# Patient Record
Sex: MALE | Race: WHITE | NOT HISPANIC OR LATINO | Employment: OTHER | URBAN - METROPOLITAN AREA
[De-identification: names, ages, dates, MRNs, and addresses within clinical notes are randomized per-mention and may not be internally consistent; named-entity substitution may affect disease eponyms.]

---

## 2024-03-14 PROBLEM — M48.062 NEUROGENIC CLAUDICATION DUE TO LUMBAR SPINAL STENOSIS: Status: ACTIVE | Noted: 2024-03-14

## 2024-05-06 ENCOUNTER — HOSPITAL ENCOUNTER (OUTPATIENT)
Dept: RADIOLOGY | Facility: MEDICAL CENTER | Age: 79
End: 2024-05-06
Payer: COMMERCIAL

## 2024-05-07 ENCOUNTER — APPOINTMENT (OUTPATIENT)
Dept: RADIOLOGY | Facility: MEDICAL CENTER | Age: 79
DRG: 029 | End: 2024-05-07
Attending: STUDENT IN AN ORGANIZED HEALTH CARE EDUCATION/TRAINING PROGRAM
Payer: COMMERCIAL

## 2024-05-07 ENCOUNTER — HOSPITAL ENCOUNTER (INPATIENT)
Facility: MEDICAL CENTER | Age: 79
LOS: 2 days | DRG: 029 | End: 2024-05-09
Attending: STUDENT IN AN ORGANIZED HEALTH CARE EDUCATION/TRAINING PROGRAM | Admitting: STUDENT IN AN ORGANIZED HEALTH CARE EDUCATION/TRAINING PROGRAM
Payer: COMMERCIAL

## 2024-05-07 ENCOUNTER — APPOINTMENT (OUTPATIENT)
Dept: RADIOLOGY | Facility: MEDICAL CENTER | Age: 79
DRG: 029 | End: 2024-05-07
Attending: INTERNAL MEDICINE
Payer: COMMERCIAL

## 2024-05-07 DIAGNOSIS — M48.062 NEUROGENIC CLAUDICATION DUE TO LUMBAR SPINAL STENOSIS: ICD-10-CM

## 2024-05-07 DIAGNOSIS — G96.00 POSTOPERATIVE CSF LEAK: ICD-10-CM

## 2024-05-07 DIAGNOSIS — G97.82 POSTOPERATIVE CSF LEAK: ICD-10-CM

## 2024-05-07 DIAGNOSIS — R51.9 NONINTRACTABLE HEADACHE, UNSPECIFIED CHRONICITY PATTERN, UNSPECIFIED HEADACHE TYPE: ICD-10-CM

## 2024-05-07 PROBLEM — T88.8XXA FLUID COLLECTION AT SURGICAL SITE: Status: ACTIVE | Noted: 2024-05-07

## 2024-05-07 PROBLEM — Z71.89 ACP (ADVANCE CARE PLANNING): Status: ACTIVE | Noted: 2024-05-07

## 2024-05-07 LAB
ALBUMIN SERPL BCP-MCNC: 4.2 G/DL (ref 3.2–4.9)
ALBUMIN/GLOB SERPL: 1.8 G/DL
ALP SERPL-CCNC: 74 U/L (ref 30–99)
ALT SERPL-CCNC: 24 U/L (ref 2–50)
ANION GAP SERPL CALC-SCNC: 11 MMOL/L (ref 7–16)
APPEARANCE UR: CLEAR
AST SERPL-CCNC: 12 U/L (ref 12–45)
BASOPHILS # BLD AUTO: 0.3 % (ref 0–1.8)
BASOPHILS # BLD: 0.03 K/UL (ref 0–0.12)
BILIRUB SERPL-MCNC: 0.3 MG/DL (ref 0.1–1.5)
BILIRUB UR QL STRIP.AUTO: NEGATIVE
BUN SERPL-MCNC: 20 MG/DL (ref 8–22)
CALCIUM ALBUM COR SERPL-MCNC: 8.7 MG/DL (ref 8.5–10.5)
CALCIUM SERPL-MCNC: 8.9 MG/DL (ref 8.5–10.5)
CHLORIDE SERPL-SCNC: 105 MMOL/L (ref 96–112)
CO2 SERPL-SCNC: 23 MMOL/L (ref 20–33)
COLOR UR: YELLOW
CORTIS SERPL-MCNC: 12.7 UG/DL (ref 0–23)
CREAT SERPL-MCNC: 0.82 MG/DL (ref 0.5–1.4)
CRP SERPL HS-MCNC: <0.3 MG/DL (ref 0–0.75)
EKG IMPRESSION: NORMAL
EOSINOPHIL # BLD AUTO: 0.56 K/UL (ref 0–0.51)
EOSINOPHIL NFR BLD: 6.3 % (ref 0–6.9)
ERYTHROCYTE [DISTWIDTH] IN BLOOD BY AUTOMATED COUNT: 42.2 FL (ref 35.9–50)
ERYTHROCYTE [SEDIMENTATION RATE] IN BLOOD BY WESTERGREN METHOD: 4 MM/HOUR (ref 0–20)
GFR SERPLBLD CREATININE-BSD FMLA CKD-EPI: 90 ML/MIN/1.73 M 2
GLOBULIN SER CALC-MCNC: 2.4 G/DL (ref 1.9–3.5)
GLUCOSE SERPL-MCNC: 102 MG/DL (ref 65–99)
GLUCOSE UR STRIP.AUTO-MCNC: NEGATIVE MG/DL
HCT VFR BLD AUTO: 41.3 % (ref 42–52)
HGB BLD-MCNC: 14.3 G/DL (ref 14–18)
IMM GRANULOCYTES # BLD AUTO: 0.03 K/UL (ref 0–0.11)
IMM GRANULOCYTES NFR BLD AUTO: 0.3 % (ref 0–0.9)
INR PPP: 1.07 (ref 0.87–1.13)
KETONES UR STRIP.AUTO-MCNC: NEGATIVE MG/DL
LACTATE SERPL-SCNC: 1 MMOL/L (ref 0.5–2)
LDH SERPL L TO P-CCNC: 162 U/L (ref 107–266)
LEUKOCYTE ESTERASE UR QL STRIP.AUTO: NEGATIVE
LYMPHOCYTES # BLD AUTO: 1.86 K/UL (ref 1–4.8)
LYMPHOCYTES NFR BLD: 21.1 % (ref 22–41)
MCH RBC QN AUTO: 30.6 PG (ref 27–33)
MCHC RBC AUTO-ENTMCNC: 34.6 G/DL (ref 32.3–36.5)
MCV RBC AUTO: 88.2 FL (ref 81.4–97.8)
MICRO URNS: ABNORMAL
MONOCYTES # BLD AUTO: 0.6 K/UL (ref 0–0.85)
MONOCYTES NFR BLD AUTO: 6.8 % (ref 0–13.4)
NEUTROPHILS # BLD AUTO: 5.75 K/UL (ref 1.82–7.42)
NEUTROPHILS NFR BLD: 65.2 % (ref 44–72)
NITRITE UR QL STRIP.AUTO: NEGATIVE
NRBC # BLD AUTO: 0 K/UL
NRBC BLD-RTO: 0 /100 WBC (ref 0–0.2)
PH UR STRIP.AUTO: 5.5 [PH] (ref 5–8)
PLATELET # BLD AUTO: 246 K/UL (ref 164–446)
PMV BLD AUTO: 9.5 FL (ref 9–12.9)
POTASSIUM SERPL-SCNC: 3.9 MMOL/L (ref 3.6–5.5)
PROCALCITONIN SERPL-MCNC: <0.05 NG/ML
PROT SERPL-MCNC: 6.6 G/DL (ref 6–8.2)
PROT UR QL STRIP: NEGATIVE MG/DL
PROTHROMBIN TIME: 14.1 SEC (ref 12–14.6)
RBC # BLD AUTO: 4.68 M/UL (ref 4.7–6.1)
RBC UR QL AUTO: NEGATIVE
SODIUM SERPL-SCNC: 139 MMOL/L (ref 135–145)
SP GR UR STRIP.AUTO: >=1.045
UROBILINOGEN UR STRIP.AUTO-MCNC: 0.2 MG/DL
WBC # BLD AUTO: 8.8 K/UL (ref 4.8–10.8)

## 2024-05-07 PROCEDURE — 99223 1ST HOSP IP/OBS HIGH 75: CPT | Mod: 25,AI | Performed by: STUDENT IN AN ORGANIZED HEALTH CARE EDUCATION/TRAINING PROGRAM

## 2024-05-07 PROCEDURE — 99497 ADVNCD CARE PLAN 30 MIN: CPT | Performed by: STUDENT IN AN ORGANIZED HEALTH CARE EDUCATION/TRAINING PROGRAM

## 2024-05-07 PROCEDURE — 93010 ELECTROCARDIOGRAM REPORT: CPT | Performed by: INTERNAL MEDICINE

## 2024-05-07 RX ORDER — ACETAMINOPHEN 325 MG/1
650 TABLET ORAL EVERY 6 HOURS
Status: DISCONTINUED | OUTPATIENT
Start: 2024-05-07 | End: 2024-05-09 | Stop reason: HOSPADM

## 2024-05-07 RX ORDER — AMOXICILLIN 250 MG
2 CAPSULE ORAL EVERY EVENING
Status: DISCONTINUED | OUTPATIENT
Start: 2024-05-07 | End: 2024-05-09 | Stop reason: HOSPADM

## 2024-05-07 RX ORDER — ONDANSETRON 2 MG/ML
4 INJECTION INTRAMUSCULAR; INTRAVENOUS EVERY 4 HOURS PRN
Status: DISCONTINUED | OUTPATIENT
Start: 2024-05-07 | End: 2024-05-09 | Stop reason: HOSPADM

## 2024-05-07 RX ORDER — OXYCODONE HYDROCHLORIDE 5 MG/1
2.5 TABLET ORAL
Status: DISCONTINUED | OUTPATIENT
Start: 2024-05-07 | End: 2024-05-09 | Stop reason: HOSPADM

## 2024-05-07 RX ORDER — SCOLOPAMINE TRANSDERMAL SYSTEM 1 MG/1
1 PATCH, EXTENDED RELEASE TRANSDERMAL
Status: DISCONTINUED | OUTPATIENT
Start: 2024-05-07 | End: 2024-05-07

## 2024-05-07 RX ORDER — FINASTERIDE 5 MG/1
5 TABLET, FILM COATED ORAL DAILY
Status: DISCONTINUED | OUTPATIENT
Start: 2024-05-07 | End: 2024-05-07

## 2024-05-07 RX ORDER — BUTALBITAL, ACETAMINOPHEN AND CAFFEINE 50; 325; 40 MG/1; MG/1; MG/1
1 TABLET ORAL EVERY 6 HOURS PRN
Status: DISCONTINUED | OUTPATIENT
Start: 2024-05-07 | End: 2024-05-09 | Stop reason: HOSPADM

## 2024-05-07 RX ORDER — TAMSULOSIN HYDROCHLORIDE 0.4 MG/1
0.4 CAPSULE ORAL
Status: DISCONTINUED | OUTPATIENT
Start: 2024-05-07 | End: 2024-05-07

## 2024-05-07 RX ORDER — ACETAMINOPHEN 325 MG/1
650 TABLET ORAL EVERY 6 HOURS PRN
Status: DISCONTINUED | OUTPATIENT
Start: 2024-05-12 | End: 2024-05-09 | Stop reason: HOSPADM

## 2024-05-07 RX ORDER — OXYCODONE HYDROCHLORIDE 5 MG/1
5 TABLET ORAL
Status: DISCONTINUED | OUTPATIENT
Start: 2024-05-07 | End: 2024-05-09 | Stop reason: HOSPADM

## 2024-05-07 RX ORDER — IBUPROFEN 800 MG/1
800 TABLET ORAL 3 TIMES DAILY PRN
Status: DISCONTINUED | OUTPATIENT
Start: 2024-05-10 | End: 2024-05-07

## 2024-05-07 RX ORDER — ACETAMINOPHEN 325 MG/1
650 TABLET ORAL EVERY 6 HOURS PRN
Status: DISCONTINUED | OUTPATIENT
Start: 2024-05-07 | End: 2024-05-07

## 2024-05-07 RX ORDER — IPRATROPIUM BROMIDE AND ALBUTEROL SULFATE 2.5; .5 MG/3ML; MG/3ML
3 SOLUTION RESPIRATORY (INHALATION)
Status: DISCONTINUED | OUTPATIENT
Start: 2024-05-07 | End: 2024-05-09 | Stop reason: HOSPADM

## 2024-05-07 RX ORDER — HYDROMORPHONE HYDROCHLORIDE 1 MG/ML
0.25 INJECTION, SOLUTION INTRAMUSCULAR; INTRAVENOUS; SUBCUTANEOUS
Status: DISCONTINUED | OUTPATIENT
Start: 2024-05-07 | End: 2024-05-09 | Stop reason: HOSPADM

## 2024-05-07 RX ORDER — METHOCARBAMOL 750 MG/1
750 TABLET, FILM COATED ORAL 4 TIMES DAILY PRN
Status: DISCONTINUED | OUTPATIENT
Start: 2024-05-07 | End: 2024-05-09 | Stop reason: HOSPADM

## 2024-05-07 RX ORDER — ONDANSETRON 4 MG/1
4 TABLET, ORALLY DISINTEGRATING ORAL EVERY 4 HOURS PRN
Status: DISCONTINUED | OUTPATIENT
Start: 2024-05-07 | End: 2024-05-09 | Stop reason: HOSPADM

## 2024-05-07 RX ORDER — LABETALOL HYDROCHLORIDE 5 MG/ML
10 INJECTION, SOLUTION INTRAVENOUS EVERY 4 HOURS PRN
Status: DISCONTINUED | OUTPATIENT
Start: 2024-05-07 | End: 2024-05-09 | Stop reason: HOSPADM

## 2024-05-07 RX ORDER — SODIUM CHLORIDE, SODIUM LACTATE, POTASSIUM CHLORIDE, AND CALCIUM CHLORIDE .6; .31; .03; .02 G/100ML; G/100ML; G/100ML; G/100ML
500 INJECTION, SOLUTION INTRAVENOUS
Status: DISCONTINUED | OUTPATIENT
Start: 2024-05-07 | End: 2024-05-09 | Stop reason: HOSPADM

## 2024-05-07 RX ORDER — LISINOPRIL 2.5 MG/1
5 TABLET ORAL DAILY
Status: DISCONTINUED | OUTPATIENT
Start: 2024-05-07 | End: 2024-05-09 | Stop reason: HOSPADM

## 2024-05-07 RX ORDER — POLYETHYLENE GLYCOL 3350 17 G/17G
1 POWDER, FOR SOLUTION ORAL
Status: DISCONTINUED | OUTPATIENT
Start: 2024-05-07 | End: 2024-05-09 | Stop reason: HOSPADM

## 2024-05-07 RX ORDER — KETOROLAC TROMETHAMINE 15 MG/ML
15 INJECTION, SOLUTION INTRAMUSCULAR; INTRAVENOUS EVERY 6 HOURS
Status: DISCONTINUED | OUTPATIENT
Start: 2024-05-07 | End: 2024-05-07

## 2024-05-07 RX ORDER — LORAZEPAM 0.5 MG/1
0.5 TABLET ORAL
Status: DISCONTINUED | OUTPATIENT
Start: 2024-05-07 | End: 2024-05-09 | Stop reason: HOSPADM

## 2024-05-07 RX ADMIN — LISINOPRIL 5 MG: 2.5 TABLET ORAL at 06:08

## 2024-05-07 RX ADMIN — BUTALBITAL, ACETAMINOPHEN AND CAFFEINE 1 TABLET: 325; 50; 40 TABLET ORAL at 17:06

## 2024-05-07 RX ADMIN — BUTALBITAL, ACETAMINOPHEN AND CAFFEINE 1 TABLET: 325; 50; 40 TABLET ORAL at 10:17

## 2024-05-07 RX ADMIN — GADOTERIDOL 15 ML: 279.3 INJECTION, SOLUTION INTRAVENOUS at 05:30

## 2024-05-07 RX ADMIN — ACETAMINOPHEN 650 MG: 325 TABLET, FILM COATED ORAL at 23:07

## 2024-05-07 RX ADMIN — ACETAMINOPHEN 650 MG: 325 TABLET, FILM COATED ORAL at 17:07

## 2024-05-07 RX ADMIN — SENNOSIDES AND DOCUSATE SODIUM 2 TABLET: 50; 8.6 TABLET ORAL at 17:06

## 2024-05-07 ASSESSMENT — ENCOUNTER SYMPTOMS
VOMITING: 0
SHORTNESS OF BREATH: 0
ABDOMINAL PAIN: 0
CHILLS: 0
SPEECH CHANGE: 0
BLURRED VISION: 0
HEADACHES: 1
DOUBLE VISION: 0
DIZZINESS: 0
PALPITATIONS: 0
FOCAL WEAKNESS: 0
COUGH: 0
MYALGIAS: 0
SEIZURES: 0
WEAKNESS: 0
BRUISES/BLEEDS EASILY: 0
HEARTBURN: 0
NAUSEA: 0
DEPRESSION: 0
FEVER: 0

## 2024-05-07 ASSESSMENT — PAIN DESCRIPTION - PAIN TYPE
TYPE: ACUTE PAIN

## 2024-05-07 ASSESSMENT — LIFESTYLE VARIABLES: SUBSTANCE_ABUSE: 0

## 2024-05-07 ASSESSMENT — FIBROSIS 4 INDEX: FIB4 SCORE: 0.77

## 2024-05-07 NOTE — WOUND TEAM
Wound consult placed regarding back incision. Chart and images reviewed. Pt has raised red incision with staples concerning for fluid build up. Per chart review neurosurgery has been consulted. Orders placed for xeroform until pt can be evaluated by MD. Wound consult completed. Please defer to surgery team for further recommendations.

## 2024-05-07 NOTE — PROGRESS NOTES
Patient seen and examined at bedside. He is in no acute distress. He denies any history of CHF, MI or stroke. Low cardiac risk for surgery. EKG and CXR WNL. NPO at midnight.

## 2024-05-07 NOTE — CONSULTS
DATE OF CONSULTATION: 5/7/2024       CONSULTING PHYSICIAN: Soumya Lester M.D.     REASON FOR CONSULTATION: Postoperative pseudomeningocele    HISTORY OF PRESENT ILLNESS:     This is 79-year-old man  Underwent initially uncomplicated L2-3 laminectomy on 4/22/2024.  Postoperatively he developed positional headaches but his wound was flat and dry.  Overnight he developed swelling in his wound.  He went to the emergency room at [place] and was transferred down here.  When he lies flat he has no headache nausea.  When he stands up he gets increasing headache and nausea.  There is a little bit of leg pain but no real sciatica.  He essentially has positional headaches with some swelling is noted.      PAST MEDICAL HISTORY:  has a past medical history of Arthritis and Prostate disorder.     PAST SURGICAL HISTORY:  has a past surgical history that includes other (1964); other (7/99); shoulder decompression arthroscopic (5/27/08); clavicle distal excision (5/27/08); rotator cuff repair (5/27/08); and biceps tendon repair (5/27/08).     ALLERGIES:   Allergies   Allergen Reactions    Other Misc      norcal        CURRENT MEDICATIONS:   Home Medications       Reviewed by Kristal Mcgovern R.N. (Registered Nurse) on 05/07/24 at 0600  Med List Status: Not Addressed     Medication Last Dose Status   acyclovir (ZOVIRAX) 400 MG tablet 5/4/2024 Active   finasteride (PROSCAR) 5 MG TABS  Active   hydrocodone-acetaminophen (NORCO) 5-325 MG TABS per tablet  Active   lisinopril (PRINIVIL) 5 MG TABS 5/6/2024 Active   lisinopril (PRINIVIL) 5 MG TABS 5/6/2024 Active   meloxicam (MOBIC) 15 MG tablet 5/7/2024 Active   methylPREDNISolone (MEDROL DOSEPAK) 4 MG Tablet Therapy Pack 5/7/2024 Active   ondansetron (ZOFRAN ODT) 4 MG TABLET DISPERSIBLE  Active   scopolamine (TRANSDERM-SCOP) 1 mg/72hr PATCH 72 HR  Active   tamsulosin (FLOMAX) 0.4 MG capsule  Active                    FAMILY HISTORY:   Family History   Problem Relation Age of Onset     Cancer Father     Diabetes Sister         SOCIAL HISTORY:   Social History     Tobacco Use    Smoking status: Never    Smokeless tobacco: Never   Substance and Sexual Activity    Alcohol use: Yes     Alcohol/week: 0.5 - 3.5 oz     Types: 1 - 7 drink(s) per week    Drug use: No    Sexual activity: Yes     Partners: Female       REVIEW OF SYSTEMS: Comprehensive review of systems is negative with the   exception of the aforementioned HPI, PMH, and PSH elements in accordance with CMS guidelines.     PHYSICAL EXAMINATION:     VITAL SIGNS: BP (!) 141/83   Pulse 64   Temp 36.7 °C (98.1 °F) (Temporal)   Resp 16   Wt 78 kg (171 lb 15.3 oz)   SpO2 94%     GENERAL:       Awake, alert.  .   Speech fluent appropriate   In no apparent distress   vitals are stable.  Wound is a little bit swollen.  There is no drainage.  Full strength in his legs.    LABORATORY VALUES:   Recent Labs     05/07/24  0153   WBC 8.8   RBC 4.68*   HEMOGLOBIN 14.3   HEMATOCRIT 41.3*   MCV 88.2   MCH 30.6   MCHC 34.6   RDW 42.2   PLATELETCT 246   MPV 9.5     Recent Labs     05/07/24  0153   SODIUM 139   POTASSIUM 3.9   CHLORIDE 105   CO2 23   GLUCOSE 102*   BUN 20   CREATININE 0.82   CALCIUM 8.9     Recent Labs     05/07/24  0153   INR 1.07        IMAGING:   MR-LUMBAR SPINE-WITH & W/O   Final Result         Postoperative changes of laminectomy at L2-L3 with a fluid collection at the laminectomy site that extends into the right posterolateral epidural space and causes mild mass effect upon the thecal sac which is displaced slightly anteriorly and to the left    of midline. However, there is no significant cauda equina compression. This could represent a CSF leak or a seroma.      Moderate right foraminal narrowing at L3-4, L4-5, L5-S1.          IMPRESSION AND PLAN:    1.  Status post L2-3 laminectomy for/20 2/2024    2.  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     Active Hospital Problems    Diagnosis      Postoperative surgical complication involving nervous system associated with nervous system procedure, unspecified complication [G97.82]     ACP (advance care planning) [Z71.89]     Fluid collection at surgical site [T88.8XXA]     Neurogenic claudication due to lumbar spinal stenosis [M48.062]     BPH (benign prostatic hyperplasia) [N40.0]     HTN (hypertension) [I10]     Hyperlipemia [E78.5]      High HDL       I offered him reopening of his lumbar wound and repair of pseudomeningocele    I discussed the surgical procedure, goals alternatives, risks and potential complications in detail. Risks of a general anaesthetic include but are not limited to death, cardiorespiratory compromise, MI, DVT, PE and potential anaesthetic related problems to be discussed with anaesthesiology preoperatively. It was explained the risks of surgery included but was not limited to the preceding list. Discussed no absolute guarantee of success and possible need of further surgery. Discussed regenerating nerve root phenomenon and associated symptoms. If instrumentation was used, the risks of hardware failure, further surgery, adjacent segment disease, need for revision, stiffness etc were all discussed. I discussed risk of nerve root or spinal cord injury transient or permanent, remote risk of paralysis, regenerating nerve root phenomenon, infection, CSF leak, bowel and bladder difficulties, hemorrhage and possible need for blood tranfusion were discussed. We discussed possible need for further surgery. No guarantee was given or implied. A handout was provided. I used the bone model, imaging and handout literature to assist the patient with their decision making.I have answered all questions to the best of my ability.  I explained to the patient we may be using neurophysiological monitoring during the case (EMG/SSEP/MEP). We can put them in touch with our monitoring service if requested, for cost breakdown etc.I recommended to the patient  visit our web site  [email or web address] to further review conservative and surgical treatment for more information.  The patient was provided with the option of accessing their health record via an EMR portal. They were encouraged to contact me with questions if they did not understand everything.  We will order a cervical brace/orthosis to support the patient’s weak spinal muscles and reduce pain by restricting mobility of the neck particularly flexion, extension and rotation.      1.  Today activity as tolerated.  Bedrest.  2. Analgesia  3.  N.p.o. 12 midnight.  Surgery Wednesday morning    ____________________________________   STEFFEN FONTANA MD, PhD, [facility]      DD: 5/7/2024   DT: 7:35 AM      The history was taken was taken from the notes, the ER physician, the patient if possible and any family. Transcription software was used. I have perused the dictation and corrected to the best of my ability  but am not responsible for any errors or omissions as a result of using speech-text software.

## 2024-05-07 NOTE — ASSESSMENT & PLAN NOTE
Recent L2-L3 laminectomy by Dr. Lester 4/22/2024    CT lumbar spine: There is approximately 6.6 x 0.1 cm fluid collection in the laminectomy bed extending to was the surgical wound.  There is some heterogeneous enhancement within this collection.  May represent active venous hemorrhage.  Recommend surgical consultation    Currently, no evidence of SIRS or sepsis, defer antibiotic at this time  Supportive pain control  Follow cultures  Check MRI L-spine  Consult Dr. Lester in AM

## 2024-05-07 NOTE — CARE PLAN
The patient is Stable - Low risk of patient condition declining or worsening      Problem: Pain - Standard  Goal: Alleviation of pain or a reduction in pain to the patient’s comfort goal  Outcome: Progressing   Working with patient to manage pain, patient verbalized understanding of education.   - rest   - repositioning    - distraction   - ice    - medication   Shift Goals  Clinical Goals: safety, rest  Patient Goals: rest  Family Goals: na    Progress made toward(s) clinical / shift goals:  progressing     Patient is not progressing towards the following goals:

## 2024-05-07 NOTE — PROGRESS NOTES
[facility] DIRECT ADMISSION REPORT  Transferring facility: [facility] ER  Transferring physician: Uriel Garzon    Chief complaint: back pain, swelling  Pertinent history & patient course: 79-year-old male who history of recent L2-L3 laminectomy by Dr. Lester approximately 2 weeks ago presented to ER with pain and swelling lower back. CT concerning for fluid collection, possible hemorrhage. Dr. Haynes recommended transfer to [facility] for evaluation by Dr. Lester in AM.    Pertinent imaging & lab results:   CT lumbar: 6.6 x 8.1 cm at the laminectomy bed, possible venous hemorrhage  Hemoglobin 14.1  WNL CBC, CRP, procalcitonin    Consultants called prior to transfer and pertinent input from consultants: Orthospine surgery (Dr. Haynes)  Code Status:  per transferring provider, I personally verified with the transferring provider patient's code status and the transferring provider has confirmed this with the patient.  Reason for Transfer: Orthospine surgery evaluation  Further work up or recommendations requested prior to transfer: MRI    Patient accepted for transfer: Yes  Accepting [facility] Facility: [facility] - Nursing to notify the Triage Coordinator in the [facility] via Voalte or Phone ext. [phone] when patient arrives to the unit. The Triage Coordinator will assign the admitting provider.    Consultants to be called upon arrival: Orthospine in AM  Admission status: Inpatient.   Floor requested: medsur  If ICU transfer, name of intensivist case discussed with and pertinent input from critical care:     The admitting provider is the point of contact for questions or concerns regarding patient's care.

## 2024-05-07 NOTE — ASSESSMENT & PLAN NOTE
PT stated that has an internal sore that has not improved, stated that it feels 10% better.   Pt stated that he has some pain \"like tingling in the area\" like his foot fell asleep especially at night. PT stated that he has a pressure pain on his right side however its not new has been going on for awhile.     Stated that writer will discuss with NP Monday, and see if we can prescribe a stronger abx.   Pt stated that we do NOT need to call and to send the prescription as marked below.   [facility] [place].     Plan listed below from Jennifer last note:  PLAN  1. Continue tamsulosin  2. Doxycycline 100mg BID x10 days- 1 refill  3. If no improvement will try cipro  4. Patient to notify coumadin clinic  5. If no improvement with antibiotics, low threshold for US testicles  6. If pain resolves, Return to clinic one year for next BTS cystoscopy- already scheduled   7. Nitrofurantoin prescription for next year cystoscopy sent at last visit   statin

## 2024-05-07 NOTE — H&P
Hospital Medicine History & Physical Note    Date of Service  5/7/2024    Primary Care Physician  Eladio Montanez M.D.    Consultants  None    Code Status  Full Code    Chief Complaint  No chief complaint on file.  Swelling in back    History of Presenting Illness  Alec Cruz is a 79 y.o. male with history of recent L2-L3 laminectomy by Dr. Lester 4/22/2024 who presented 5/7/2024 as direct transfer for higher level of care.  Patient reported having laminectomy on 4/22, seen outpatient with Dr. Lester 4 days after surgery, headache at that time.  Headache resolved since then.  Denies recent fever, chills, traumatic injury to surgical site, no loss of bladder and/or bowel incontinence.  Earlier yesterday, patient's wife noted swelling at the surgical site, became concerned and patient went to ER at the insistence of wife for concern of infection.    Workup from [facility] ER included:  CBC: WBC 10.6, hemoglobin 14.1, hematocrit 42.1, platelet 253  CMP: Sodium 136, potassium 4.0, chloride 105, bicarb 23, alkaline phosphatase 60, AST 22, ALT 32, BUN 22, glucose 124, creatinine 1.1, calcium 9.2, total protein 7.3, albumin 4.2, total bilirubin 0.5  CRP 0.28  Procalcitonin 0.053    CT lumbar spine: There is approximately 6.6 x 0.1 cm fluid collection in the laminectomy bed extending to was the surgical wound.  There is some heterogeneous enhancement within this collection.  May represent active venous hemorrhage.  Recommend surgical consultation    Case was discussed with Dr. Haynes, who recommended patient be transferred to [facility] for evaluation with Dr. Lester. It was felt that possible CSF leak, rather than active hemorrhage or abscess.  No signs of SIRS, no leukocytosis, WNL inflammatory markers, therefore no antibiotic given.    Patient was seen by me at [facility] arrival, admitted to medicine service for further evaluation treatment.  At time of my evaluation, no acute distress.    I discussed the plan of  care with patient, bedside RN, and pharmacy.    Review of Systems  Review of Systems   Constitutional:  Negative for chills and fever.   HENT:  Negative for hearing loss and tinnitus.    Eyes:  Negative for blurred vision and double vision.   Respiratory:  Negative for cough and shortness of breath.    Cardiovascular:  Negative for chest pain and palpitations.   Gastrointestinal:  Negative for abdominal pain, heartburn, nausea and vomiting.   Genitourinary:  Negative for dysuria and urgency.   Musculoskeletal:  Negative for joint pain and myalgias.   Skin:  Negative for rash.   Neurological:  Positive for headaches. Negative for dizziness, speech change, focal weakness, seizures and weakness.   Endo/Heme/Allergies:  Negative for environmental allergies. Does not bruise/bleed easily.   Psychiatric/Behavioral:  Negative for depression and substance abuse.    All other systems reviewed and are negative.      Past Medical History   has a past medical history of Arthritis and Prostate disorder.    Surgical History   has a past surgical history that includes other (1964); other (7/99); shoulder decompression arthroscopic (5/27/08); clavicle distal excision (5/27/08); rotator cuff repair (5/27/08); and biceps tendon repair (5/27/08).     Family History  family history includes Cancer in his father; Diabetes in his sister.   Family history reviewed with patient. There is family history that is pertinent to the chief complaint.     Social History   reports that he has never smoked. He has never used smokeless tobacco. He reports current alcohol use of about 0.5 - 3.5 oz of alcohol per week. He reports that he does not use drugs.    Allergies  Allergies   Allergen Reactions    Other Misc      norcal       Medications  Prior to Admission Medications   Prescriptions Last Dose Informant Patient Reported? Taking?   acyclovir (ZOVIRAX) 400 MG tablet   No No   Sig: Take 1 Tab by mouth 2 times a day.   finasteride (PROSCAR) 5 MG  TABS   No No   Sig: Take 1 Tab by mouth every day.   hydrocodone-acetaminophen (NORCO) 5-325 MG TABS per tablet   No No   Sig: Take 1-2 Tabs by mouth every four hours as needed.   lisinopril (PRINIVIL) 5 MG TABS   No No   Sig: Take 1 Tab by mouth every day.   lisinopril (PRINIVIL) 5 MG TABS   No No   Sig: Take 1 Tab by mouth every day.   meloxicam (MOBIC) 15 MG tablet   No No   Sig: Take 1 Tablet by mouth 1/2 hour after breakfast for 540 doses. Take one  tablet with breakfast as needed for pain. No advil/aleve/motrin/ibuprofen on same day.   methylPREDNISolone (MEDROL DOSEPAK) 4 MG Tablet Therapy Pack   No No   Sig: Follow schedule on package instructions.   ondansetron (ZOFRAN ODT) 4 MG TABLET DISPERSIBLE   No No   Sig: Take 2 Tablets by mouth every 6 hours as needed for Nausea/Vomiting.   scopolamine (TRANSDERM-SCOP) 1 mg/72hr PATCH 72 HR   No No   Sig: Place 1 Patch on the skin every 72 hours.   tamsulosin (FLOMAX) 0.4 MG capsule   No No   Sig: Take 1 Cap by mouth ONE-HALF HOUR AFTER BREAKFAST.      Facility-Administered Medications: None       Physical Exam  Temp:  [36.5 °C (97.7 °F)] 36.5 °C (97.7 °F)  Pulse:  [61] 61  Resp:  [15] 15  BP: (163)/(94) 163/94  SpO2:  [92 %] 92 %                          Physical Exam  Vitals and nursing note reviewed.   Constitutional:       General: He is not in acute distress.  HENT:      Head: Normocephalic and atraumatic.      Nose: Nose normal.      Mouth/Throat:      Mouth: Mucous membranes are moist.      Pharynx: Oropharynx is clear.   Eyes:      General: No scleral icterus.     Extraocular Movements: Extraocular movements intact.   Cardiovascular:      Rate and Rhythm: Normal rate and regular rhythm.      Pulses: Normal pulses.      Heart sounds:      No friction rub.   Pulmonary:      Effort: No respiratory distress.      Breath sounds: No wheezing or rales.   Chest:      Chest wall: No tenderness.   Abdominal:      General: There is no distension.      Tenderness: There  "is no abdominal tenderness. There is no guarding or rebound.   Musculoskeletal:         General: Normal range of motion.      Cervical back: Neck supple. No tenderness.      Right lower leg: No edema.      Left lower leg: No edema.      Comments: Swelling at lumbar region, no tenderness   Skin:     General: Skin is warm and dry.      Capillary Refill: Capillary refill takes less than 2 seconds.   Neurological:      General: No focal deficit present.      Mental Status: He is alert and oriented to person, place, and time.   Psychiatric:         Mood and Affect: Mood normal.         Laboratory:          No results for input(s): \"ALTSGPT\", \"ASTSGOT\", \"ALKPHOSPHAT\", \"TBILIRUBIN\", \"DBILIRUBIN\", \"GAMMAGT\", \"AMYLASE\", \"LIPASE\", \"ALB\", \"PREALBUMIN\", \"GLUCOSE\" in the last 72 hours.      No results for input(s): \"NTPROBNP\" in the last 72 hours.      No results for input(s): \"TROPONINT\" in the last 72 hours.    Imaging:  MR-LUMBAR SPINE-WITH & W/O    (Results Pending)       no X-Ray or EKG requiring interpretation    Assessment/Plan:  Justification for Admission Status  I anticipate this patient will require at least 2 midnights of hospitalization, therefore appropriate for inpatient status.        * Postoperative surgical complication involving nervous system associated with nervous system procedure, unspecified complication- (present on admission)  Assessment & Plan  Recent L2-L3 laminectomy by Dr. Lester 4/22/2024    CT lumbar spine: There is approximately 6.6 x 0.1 cm fluid collection in the laminectomy bed extending to was the surgical wound.  There is some heterogeneous enhancement within this collection.  May represent active venous hemorrhage.  Recommend surgical consultation    Currently, no evidence of SIRS or sepsis, defer antibiotic at this time  Supportive pain control  Follow cultures  Check MRI L-spine  Consult Dr. Lester in AM    Fluid collection at surgical site  Assessment & Plan  Unclear CSF fluid, " inflammatory, seroma, abscess/infectious, blood  Check MRI    Neurogenic claudication due to lumbar spinal stenosis- (present on admission)  Assessment & Plan  S/p recent OR by Dr. Lester    Hyperlipemia- (present on admission)  Assessment & Plan  statin    HTN (hypertension)- (present on admission)  Assessment & Plan  Lisinopril    BPH (benign prostatic hyperplasia)- (present on admission)  Assessment & Plan  Flomax, Proscar    ACP (advance care planning)  Assessment & Plan  Goal care discussed with patient in T3.  He stated he is agreeable for noninvasive, as well as invasive/heroic life-sustaining measures-including CPR/defibrillation/intubation or mechanical ventilation if needed.  He is also agreeable for further diagnostic workup and treatment of suspected postoperative surgical complication as clinically warranted, including MRI, pain control, further evaluation by orthospine/neurosurgery service.  Diagnosis, prognosis, question and concern addressed.  Full CODE STATUS confirmed.  ACP: 16 minutes        VTE prophylaxis: SCDs/TEDs

## 2024-05-07 NOTE — PROGRESS NOTES
4 Eyes Skin Assessment Completed by Kenyetta RN and Safia RN.    Head WDL  Ears WDL  Nose WDL  Mouth WDL  Neck WDL  Breast/Chest WDL  Shoulder Blades WDL  Spine Redness, lump in center of spine   (R) Arm/Elbow/Hand WDL  (L) Arm/Elbow/Hand WDL  Abdomen WDL  Groin WDL  Scrotum/Coccyx/Buttocks WDL  (R) Leg WDL  (L) Leg WDL  (R) Heel/Foot/Toe WDL  (L) Heel/Foot/Toe WDL          Devices In Places Pulse Ox      Interventions In Place Pillows    Possible Skin Injury Yes    Pictures Uploaded Into Epic Yes  Wound Consult Placed Yes  RN Wound Prevention Protocol Ordered Yes

## 2024-05-07 NOTE — ASSESSMENT & PLAN NOTE
Goal care discussed with patient in T3.  He stated he is agreeable for noninvasive, as well as invasive/heroic life-sustaining measures-including CPR/defibrillation/intubation or mechanical ventilation if needed.  He is also agreeable for further diagnostic workup and treatment of suspected postoperative surgical complication as clinically warranted, including MRI, pain control, further evaluation by orthospine/neurosurgery service.  Diagnosis, prognosis, question and concern addressed.  Full CODE STATUS confirmed.  ACP: 16 minutes

## 2024-05-07 NOTE — CARE PLAN
Problem: Knowledge Deficit - Standard  Goal: Patient and family/care givers will demonstrate understanding of plan of care, disease process/condition, diagnostic tests and medications  Outcome: Progressing   Pt educated on surgery scheduled for tomorrow. All questions answered. Pt verbalizes understanding.  Problem: Pain - Standard  Goal: Alleviation of pain or a reduction in pain to the patient’s comfort goal  Outcome: Progressing  Pt verbalizes pain control at this time. All questions answered.   The patient is Stable - Low risk of patient condition declining or worsening    Shift Goals  Clinical Goals: prep for surgery, pain control  Patient Goals: sleep, rest  Family Goals: not present    Progress made toward(s) clinical / shift goals:  Pt scheduled for surgery tomorrow. All questions answered.    Patient is not progressing towards the following goals:

## 2024-05-07 NOTE — LETTER
May 7, 2024    Patient Name: Alec Cruz  Surgeon Name: Soumya Lester M.D.  Surgery Facility: [facility] ([address] [place] [address])  Surgery Date: 5/8/2024    The time of your surgery is not final and may change up to and until the day of your surgery. You will be contacted 1-2 business days prior to your surgery date with your check-in and surgery time.    DAY OF YOUR SURGERY  Nothing to eat or drink and refrain from smoking any substance after midnight the day of your surgery. Smoking may interfere with the anesthetic and frequently produces nausea during the recovery period.    Continue taking all lifesaving medications, including the morning of your surgery with small sip of water.    You will need a responsible adult to drive you to and from your surgery.    AFTER YOUR SURGERY  2 Week Post op Appointment:   Date: 05/23/24   Time: 12:00PM  With: Levar Cazares PA-C   Location: [address] [place] [address]    6 Week Post op Appointment:   Date: 06/25/24   Time: 12:30PM   With: Levar Cazares PA-C   Location: [address] [place] [address]    MEDICATION INSTRUCTIONS  Do not take these medications prior to your procedure:            Anti-inflammatories: stop 7 days prior, restart when advised. For fusions avoid for 12 weeks after surgery            Naproxen (Naprosyn or Alleve)            Motrin            Ibuprofen            Nabumetone (Relafen)            Meloxicam (Mobic)            Celebrex            Salsalate            Diclofenac (Arthrotec, Voltaren, Flector)            Sulindac (Clinoril            Etodolac (Lodine)            Indomethacin (Indocin)            Ketoprofen            Ketorolac            Oxaprozin (Daypro)            Piroxicam (Feldene)            Blood thinners (stop after approval from the prescribing physician)            Aspirin (any dosage): Stop 14 days prior, restart 7 days after procedure            Any medications that contain aspirin in combination (ie: Excedrin migraine,  Fiorinal, and Norgesic)            Warfarin (Coumadin): Stop 14 days prior, INR day of procedure, restart 2-3 weeks after procedure            Antiplatelet: Stop 14 days prior, restart 7 days after procedure            Ticlid (ticlopidine): Stop 14 days prior            Plavix (clopidogrel): Stop 7 days prior            Aggrenox or Dipyridamole: Stop 14 days prior            ReoPro (abciximab): Stop 14 days prior            Integrilin (eptifibatide): Stop 14 days prior            Aggrastat (tirofiban): Stop 14 days prior            Lovenox (Enoxaparin): Stop 24hrs before and restart 24hrs after procedure            Heparin: Stop 24hrs before             Dalteparin (Fragmin): Stop 24hrs before            Fondaparinux (Arixtra): Stop 24hrs before            Xeralto, Dabigatran (Pradaxa) Stop 5 days prior            Eliquis (apibaxan) Stop 7 days prior    Okay to take these medications as prescribed:            Muscle relaxers            Acetaminophen and pain medications that have it in addition to oxycodone and hydrocodone            Blood pressure, cholesterol and diabetes medications are ok      TIME OFF WORK  FMLA or Disability forms can be faxed directly to [phone] or you may drop them off at any [facility]. Our office charges a $35.00 fee. Forms will be completed within 5-10 business days after payment is received. For the status of your forms you may contact our disability office directly at [phone].    DENTAL PROCDURES/CLEANINGS Avoid 3 weeks before surgery and for 3 months after surgery.    QUESTIONS ABOUT COSTS  Contact our Patient Financial Services department at [phone] for more information.    If you have any questions, please contact our office.    Aliya   Surgery Scheduler  [email or web address]  ? [phone]  Fax: [phone]  EXT [phone] [address]  [phone]

## 2024-05-08 ENCOUNTER — ANESTHESIA EVENT (OUTPATIENT)
Dept: SURGERY | Facility: MEDICAL CENTER | Age: 79
DRG: 029 | End: 2024-05-08
Payer: COMMERCIAL

## 2024-05-08 ENCOUNTER — ANESTHESIA (OUTPATIENT)
Dept: SURGERY | Facility: MEDICAL CENTER | Age: 79
DRG: 029 | End: 2024-05-08
Payer: COMMERCIAL

## 2024-05-08 LAB
BLOOD CULTURE HOLD CXBCH: NORMAL
BLOOD CULTURE HOLD CXBCH: NORMAL

## 2024-05-08 PROCEDURE — 63709 REPAIR SPINAL FLUID LEAKAGE: CPT | Mod: 78,59 | Performed by: NEUROLOGICAL SURGERY

## 2024-05-08 PROCEDURE — 22015 I&D ABSCESS P-SPINE L/S/LS: CPT | Mod: 78 | Performed by: NEUROLOGICAL SURGERY

## 2024-05-08 PROCEDURE — 22015 I&D ABSCESS P-SPINE L/S/LS: CPT | Mod: ASROC,78 | Performed by: PHYSICIAN ASSISTANT

## 2024-05-08 PROCEDURE — 63709 REPAIR SPINAL FLUID LEAKAGE: CPT | Mod: ASROC,78,59 | Performed by: PHYSICIAN ASSISTANT

## 2024-05-08 PROCEDURE — 00QT0ZZ REPAIR SPINAL MENINGES, OPEN APPROACH: ICD-10-PCS | Performed by: NEUROLOGICAL SURGERY

## 2024-05-08 DEVICE — GRAFT DURAMATRIX ONLAY PLUS 1IN X 1IN OR 2.7CM X 2.75CM: Type: IMPLANTABLE DEVICE | Site: SPINE LUMBAR | Status: FUNCTIONAL

## 2024-05-08 RX ORDER — OXYCODONE HCL 5 MG/5 ML
5 SOLUTION, ORAL ORAL
Status: COMPLETED | OUTPATIENT
Start: 2024-05-08 | End: 2024-05-08

## 2024-05-08 RX ORDER — HYDROMORPHONE HYDROCHLORIDE 1 MG/ML
0.2 INJECTION, SOLUTION INTRAMUSCULAR; INTRAVENOUS; SUBCUTANEOUS
Status: DISCONTINUED | OUTPATIENT
Start: 2024-05-08 | End: 2024-05-08 | Stop reason: HOSPADM

## 2024-05-08 RX ORDER — HYDRALAZINE HYDROCHLORIDE 20 MG/ML
5 INJECTION INTRAMUSCULAR; INTRAVENOUS
Status: DISCONTINUED | OUTPATIENT
Start: 2024-05-08 | End: 2024-05-08 | Stop reason: HOSPADM

## 2024-05-08 RX ORDER — VANCOMYCIN HYDROCHLORIDE 1 G/20ML
INJECTION, POWDER, LYOPHILIZED, FOR SOLUTION INTRAVENOUS
Status: COMPLETED | OUTPATIENT
Start: 2024-05-08 | End: 2024-05-08

## 2024-05-08 RX ORDER — LIDOCAINE HYDROCHLORIDE 20 MG/ML
INJECTION, SOLUTION EPIDURAL; INFILTRATION; INTRACAUDAL; PERINEURAL PRN
Status: DISCONTINUED | OUTPATIENT
Start: 2024-05-08 | End: 2024-05-08 | Stop reason: SURG

## 2024-05-08 RX ORDER — CEFAZOLIN SODIUM 1 G/3ML
INJECTION, POWDER, FOR SOLUTION INTRAMUSCULAR; INTRAVENOUS PRN
Status: DISCONTINUED | OUTPATIENT
Start: 2024-05-08 | End: 2024-05-08 | Stop reason: SURG

## 2024-05-08 RX ORDER — ONDANSETRON 2 MG/ML
4 INJECTION INTRAMUSCULAR; INTRAVENOUS
Status: DISCONTINUED | OUTPATIENT
Start: 2024-05-08 | End: 2024-05-08 | Stop reason: HOSPADM

## 2024-05-08 RX ORDER — MEPERIDINE HYDROCHLORIDE 25 MG/ML
12.5 INJECTION INTRAMUSCULAR; INTRAVENOUS; SUBCUTANEOUS
Status: DISCONTINUED | OUTPATIENT
Start: 2024-05-08 | End: 2024-05-08 | Stop reason: HOSPADM

## 2024-05-08 RX ORDER — ROCURONIUM BROMIDE 10 MG/ML
INJECTION, SOLUTION INTRAVENOUS PRN
Status: DISCONTINUED | OUTPATIENT
Start: 2024-05-08 | End: 2024-05-08 | Stop reason: SURG

## 2024-05-08 RX ORDER — HYDROMORPHONE HYDROCHLORIDE 1 MG/ML
0.4 INJECTION, SOLUTION INTRAMUSCULAR; INTRAVENOUS; SUBCUTANEOUS
Status: DISCONTINUED | OUTPATIENT
Start: 2024-05-08 | End: 2024-05-08 | Stop reason: HOSPADM

## 2024-05-08 RX ORDER — OXYCODONE HCL 5 MG/5 ML
SOLUTION, ORAL ORAL
Status: DISPENSED
Start: 2024-05-08 | End: 2024-05-08

## 2024-05-08 RX ORDER — LABETALOL HYDROCHLORIDE 5 MG/ML
5 INJECTION, SOLUTION INTRAVENOUS
Status: DISCONTINUED | OUTPATIENT
Start: 2024-05-08 | End: 2024-05-08 | Stop reason: HOSPADM

## 2024-05-08 RX ORDER — KETOROLAC TROMETHAMINE 15 MG/ML
INJECTION, SOLUTION INTRAMUSCULAR; INTRAVENOUS PRN
Status: DISCONTINUED | OUTPATIENT
Start: 2024-05-08 | End: 2024-05-08 | Stop reason: SURG

## 2024-05-08 RX ORDER — MAGNESIUM HYDROXIDE 1200 MG/15ML
LIQUID ORAL
Status: COMPLETED | OUTPATIENT
Start: 2024-05-08 | End: 2024-05-08

## 2024-05-08 RX ORDER — PHENYLEPHRINE HYDROCHLORIDE 10 MG/ML
INJECTION, SOLUTION INTRAMUSCULAR; INTRAVENOUS; SUBCUTANEOUS PRN
Status: DISCONTINUED | OUTPATIENT
Start: 2024-05-08 | End: 2024-05-08 | Stop reason: SURG

## 2024-05-08 RX ORDER — SODIUM CHLORIDE, SODIUM LACTATE, POTASSIUM CHLORIDE, CALCIUM CHLORIDE 600; 310; 30; 20 MG/100ML; MG/100ML; MG/100ML; MG/100ML
INJECTION, SOLUTION INTRAVENOUS CONTINUOUS
Status: DISCONTINUED | OUTPATIENT
Start: 2024-05-08 | End: 2024-05-08 | Stop reason: HOSPADM

## 2024-05-08 RX ORDER — BUPIVACAINE HYDROCHLORIDE AND EPINEPHRINE 5; 5 MG/ML; UG/ML
INJECTION, SOLUTION EPIDURAL; INTRACAUDAL; PERINEURAL
Status: DISCONTINUED | OUTPATIENT
Start: 2024-05-08 | End: 2024-05-08 | Stop reason: HOSPADM

## 2024-05-08 RX ORDER — SODIUM CHLORIDE, SODIUM LACTATE, POTASSIUM CHLORIDE, CALCIUM CHLORIDE 600; 310; 30; 20 MG/100ML; MG/100ML; MG/100ML; MG/100ML
INJECTION, SOLUTION INTRAVENOUS
Status: DISCONTINUED | OUTPATIENT
Start: 2024-05-08 | End: 2024-05-08 | Stop reason: SURG

## 2024-05-08 RX ORDER — DEXAMETHASONE SODIUM PHOSPHATE 4 MG/ML
INJECTION, SOLUTION INTRA-ARTICULAR; INTRALESIONAL; INTRAMUSCULAR; INTRAVENOUS; SOFT TISSUE PRN
Status: DISCONTINUED | OUTPATIENT
Start: 2024-05-08 | End: 2024-05-08 | Stop reason: SURG

## 2024-05-08 RX ORDER — SODIUM CHLORIDE, SODIUM LACTATE, POTASSIUM CHLORIDE, AND CALCIUM CHLORIDE .6; .31; .03; .02 G/100ML; G/100ML; G/100ML; G/100ML
IRRIGANT IRRIGATION
Status: DISCONTINUED | OUTPATIENT
Start: 2024-05-08 | End: 2024-05-08 | Stop reason: HOSPADM

## 2024-05-08 RX ORDER — OXYCODONE HCL 5 MG/5 ML
10 SOLUTION, ORAL ORAL
Status: COMPLETED | OUTPATIENT
Start: 2024-05-08 | End: 2024-05-08

## 2024-05-08 RX ORDER — ONDANSETRON 2 MG/ML
INJECTION INTRAMUSCULAR; INTRAVENOUS PRN
Status: DISCONTINUED | OUTPATIENT
Start: 2024-05-08 | End: 2024-05-08 | Stop reason: SURG

## 2024-05-08 RX ORDER — EPHEDRINE SULFATE 50 MG/ML
INJECTION, SOLUTION INTRAVENOUS PRN
Status: DISCONTINUED | OUTPATIENT
Start: 2024-05-08 | End: 2024-05-08 | Stop reason: SURG

## 2024-05-08 RX ORDER — HYDROMORPHONE HYDROCHLORIDE 1 MG/ML
0.1 INJECTION, SOLUTION INTRAMUSCULAR; INTRAVENOUS; SUBCUTANEOUS
Status: DISCONTINUED | OUTPATIENT
Start: 2024-05-08 | End: 2024-05-08 | Stop reason: HOSPADM

## 2024-05-08 RX ORDER — CEFAZOLIN SODIUM 1 G/3ML
INJECTION, POWDER, FOR SOLUTION INTRAMUSCULAR; INTRAVENOUS
Status: DISCONTINUED | OUTPATIENT
Start: 2024-05-08 | End: 2024-05-08 | Stop reason: HOSPADM

## 2024-05-08 RX ADMIN — OXYCODONE HYDROCHLORIDE 10 MG: 5 SOLUTION ORAL at 10:33

## 2024-05-08 RX ADMIN — SODIUM CHLORIDE, POTASSIUM CHLORIDE, SODIUM LACTATE AND CALCIUM CHLORIDE: 600; 310; 30; 20 INJECTION, SOLUTION INTRAVENOUS at 08:37

## 2024-05-08 RX ADMIN — ACETAMINOPHEN 650 MG: 325 TABLET, FILM COATED ORAL at 11:45

## 2024-05-08 RX ADMIN — KETOROLAC TROMETHAMINE 15 MG: 15 INJECTION, SOLUTION INTRAMUSCULAR; INTRAVENOUS at 09:56

## 2024-05-08 RX ADMIN — SUGAMMADEX 200 MG: 100 INJECTION, SOLUTION INTRAVENOUS at 09:59

## 2024-05-08 RX ADMIN — PHENYLEPHRINE HYDROCHLORIDE 200 MCG: 10 INJECTION INTRAVENOUS at 09:35

## 2024-05-08 RX ADMIN — FENTANYL CITRATE 50 MCG: 50 INJECTION, SOLUTION INTRAMUSCULAR; INTRAVENOUS at 09:53

## 2024-05-08 RX ADMIN — CEFAZOLIN 2 G: 1 INJECTION, POWDER, FOR SOLUTION INTRAMUSCULAR; INTRAVENOUS at 08:57

## 2024-05-08 RX ADMIN — PHENYLEPHRINE HYDROCHLORIDE 100 MCG: 10 INJECTION INTRAVENOUS at 09:26

## 2024-05-08 RX ADMIN — ACETAMINOPHEN 650 MG: 325 TABLET, FILM COATED ORAL at 04:48

## 2024-05-08 RX ADMIN — BUTALBITAL, ACETAMINOPHEN AND CAFFEINE 1 TABLET: 325; 50; 40 TABLET ORAL at 22:49

## 2024-05-08 RX ADMIN — BUTALBITAL, ACETAMINOPHEN AND CAFFEINE 1 TABLET: 325; 50; 40 TABLET ORAL at 15:40

## 2024-05-08 RX ADMIN — BUTALBITAL, ACETAMINOPHEN AND CAFFEINE 1 TABLET: 325; 50; 40 TABLET ORAL at 04:48

## 2024-05-08 RX ADMIN — PROPOFOL 200 MG: 10 INJECTION, EMULSION INTRAVENOUS at 08:57

## 2024-05-08 RX ADMIN — FENTANYL CITRATE 50 MCG: 50 INJECTION, SOLUTION INTRAMUSCULAR; INTRAVENOUS at 09:19

## 2024-05-08 RX ADMIN — SENNOSIDES AND DOCUSATE SODIUM 2 TABLET: 50; 8.6 TABLET ORAL at 16:47

## 2024-05-08 RX ADMIN — ACETAMINOPHEN 650 MG: 325 TABLET, FILM COATED ORAL at 22:49

## 2024-05-08 RX ADMIN — LISINOPRIL 5 MG: 2.5 TABLET ORAL at 04:49

## 2024-05-08 RX ADMIN — LIDOCAINE HYDROCHLORIDE 100 MG: 20 INJECTION, SOLUTION EPIDURAL; INFILTRATION; INTRACAUDAL at 08:57

## 2024-05-08 RX ADMIN — ACETAMINOPHEN 650 MG: 325 TABLET, FILM COATED ORAL at 16:47

## 2024-05-08 RX ADMIN — ONDANSETRON 4 MG: 2 INJECTION INTRAMUSCULAR; INTRAVENOUS at 09:01

## 2024-05-08 RX ADMIN — EPHEDRINE SULFATE 10 MG: 50 INJECTION, SOLUTION INTRAVENOUS at 09:36

## 2024-05-08 RX ADMIN — DEXAMETHASONE SODIUM PHOSPHATE 8 MG: 4 INJECTION INTRA-ARTICULAR; INTRALESIONAL; INTRAMUSCULAR; INTRAVENOUS; SOFT TISSUE at 08:57

## 2024-05-08 RX ADMIN — ROCURONIUM BROMIDE 50 MG: 50 INJECTION, SOLUTION INTRAVENOUS at 08:57

## 2024-05-08 ASSESSMENT — LIFESTYLE VARIABLES
DOES PATIENT WANT TO STOP DRINKING: NO
ALCOHOL_USE: YES
EVER HAD A DRINK FIRST THING IN THE MORNING TO STEADY YOUR NERVES TO GET RID OF A HANGOVER: NO
CONSUMPTION TOTAL: NEGATIVE
HAVE YOU EVER FELT YOU SHOULD CUT DOWN ON YOUR DRINKING: NO
HAVE PEOPLE ANNOYED YOU BY CRITICIZING YOUR DRINKING: NO
TOTAL SCORE: 0
TOTAL SCORE: 0
EVER FELT BAD OR GUILTY ABOUT YOUR DRINKING: NO
ON A TYPICAL DAY WHEN YOU DRINK ALCOHOL HOW MANY DRINKS DO YOU HAVE: 1
AVERAGE NUMBER OF DAYS PER WEEK YOU HAVE A DRINK CONTAINING ALCOHOL: 3
TOTAL SCORE: 0
HOW MANY TIMES IN THE PAST YEAR HAVE YOU HAD 5 OR MORE DRINKS IN A DAY: 0

## 2024-05-08 ASSESSMENT — PATIENT HEALTH QUESTIONNAIRE - PHQ9
1. LITTLE INTEREST OR PLEASURE IN DOING THINGS: NOT AT ALL
SUM OF ALL RESPONSES TO PHQ9 QUESTIONS 1 AND 2: 0
2. FEELING DOWN, DEPRESSED, IRRITABLE, OR HOPELESS: NOT AT ALL

## 2024-05-08 ASSESSMENT — PAIN DESCRIPTION - PAIN TYPE
TYPE: SURGICAL PAIN
TYPE: SURGICAL PAIN

## 2024-05-08 NOTE — CARE PLAN
The patient is Stable - Low risk of patient condition declining or worsening    Shift Goals  Clinical Goals: pain control, bedrest until 05/09 1000  Patient Goals: rest  Family Goals: updates    Progress made toward(s) clinical / shift goals:    Problem: Knowledge Deficit - Standard  Goal: Patient and family/care givers will demonstrate understanding of plan of care, disease process/condition, diagnostic tests and medications  Outcome: Progressing     Problem: Hemodynamics  Goal: Patient's hemodynamics, fluid balance and neurologic status will be stable or improve  Outcome: Progressing     Problem: Fluid Volume  Goal: Fluid volume balance will be maintained  Outcome: Progressing     Problem: Urinary - Renal Perfusion  Goal: Ability to achieve and maintain adequate renal perfusion and functioning will improve  Outcome: Progressing     Problem: Respiratory  Goal: Patient will achieve/maintain optimum respiratory ventilation and gas exchange  Outcome: Progressing     Problem: Mechanical Ventilation  Goal: Safe management of artificial airway and ventilation  Outcome: Progressing  Goal: Successful weaning off mechanical ventilator, spontaneously maintains adequate gas exchange  Outcome: Progressing  Goal: Patient will be able to express needs and understand communication  Outcome: Progressing     Problem: Physical Regulation  Goal: Diagnostic test results will improve  Outcome: Progressing  Goal: Signs and symptoms of infection will decrease  Outcome: Progressing     Problem: Pain - Standard  Goal: Alleviation of pain or a reduction in pain to the patient’s comfort goal  Outcome: Progressing     Problem: Lumbar/Thoracic Spine Surgery  Goal: Post-Operative Lumbar/Thoracic Spine Surgery: Patient will achieve optimal post-surgical outcomes  Outcome: Progressing     Problem: Neuro Status  Goal: Neuro status will remain stable or improve  Outcome: Progressing     Problem: Incision Care  Goal: Optimal post surgical incision  care  Outcome: Progressing     Problem: Surgical Drain Management  Goal: Proper management/care of surgical drains will be maintained  Outcome: Progressing

## 2024-05-08 NOTE — OP REPORT
1.  DATE OF SURGERY:5/8/2024    2. SURGEON:  Soumya Lester MD, PhD, ELLE, Neurosurgeon    3. ASSISTANT:  Levar Cazares PA-C    An assistant was crucial to have during the case as the assistant helped with positioning, keeping the field clear of blood and retraction. This case could not be done easily without a qualified assistant. It was medically necessary      4. TYPE OF ANESTHESIA:  General anesthesia with endotracheal intubation    5. PREOPERATIVE DIAGNOSIS:  Postoperative  pseudomeningocele/CSF leak      1.  Status post L2-3 laminectomy for/20 2/2024     2.  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    6. POSTOPERATIVE DIAGNOSIS:  Postoperative  pseudomeningocele/CSF leak      1.  Status post L2-3 laminectomy for/20 2/2024     2.  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    7. HISTORY: See formal admission H and P    his is 79-year-old man  Underwent initially uncomplicated L2-3 laminectomy on 4/22/2024.  Postoperatively he developed positional headaches but his wound was flat and dry.  Overnight he developed swelling in his wound.  He went to the emergency room at [facility] and was transferred down here.  When he lies flat he has no headache nausea.  When he stands up he gets increasing headache and nausea.  There is a little bit of leg pain but no real sciatica.  He essentially has positional headaches with some swelling is noted.    8. PREOPERATIVE PHYSICAL EXAMINATION: See formal admission H and P    Awake, alert.  .              Speech fluent appropriate        In no apparent distress        vitals are stable.  Wound is a little bit swollen.  There is no drainage.  Full strength in his legs.  9.  TITLE OF THE OPERATION:  Reopening of lumbar wound, repair of pseudomeningocele and CSF leak  Microscopic microdissection.  Incision and drainage of posterior lumbar wound-evacuation of pseudomeningocele    10. OPERATIVE FINDINGS: Large  pseudomeningocele.  No signs of acute infection.  Multiple small dural tear is on the common thecal sac extending out on the left lateral gutter L2-3.  I was somewhat 6 a Orlando-Tito and muscle blocks.  Patch to include.  Negative leaking on Valsalva       11. OPERATED SIDE/LEVEL: L23     12. IMPLANTS USED: Nil    13. COMPLICATIONS:  Nil.    14. ESTIMATED BLOOD LOSS:      0   mL        15. OPERATIVE DETAILS:  After fully informed consent, the patient was brought to the operating room.  General anesthesia was administered.  The patient was given intravenous antibiotic.  The patient was carefully turned prone on the OSI operating table on the Moise frame.  All pressure points were padded.      The posterior lumbar region was prepped and draped in a standard fashion.  The previous incision was reopened. Self-retraining retractors were placed. The dura was inspected. The pseudomeningocele was drained. The microscope was bought into the field of view.     Identified x 2 dural tears to the left of midline over the common thecal sac and extending out over the left L3 nerve root sleeve.  The dura was very thin here.  The dural tear was repaired with multiple interrupted 6-0 cortex sutures and muscle plugs. A valsalva confirmed no further dural leakage. A dural patch and Duragen glue was placed over the repair. The wound was closed with interrupted 0 Vicryl sutures for the fascia, 2-0 Vicryl for the subdermal tissues and vertical mattress interrupted  3-0 nylon sutures for the skin. All counts were correct and all instruments accounted for.      16. PROGNOSIS:  The surgery went well. At the end of the case, the patient awoke moving his/her upper and lower extremities well. The patient will stay on flat bed rest for 24 hours. I would anticipate the patient will be discharged home in 36-48 hours.  When the patient goes home, he/she will be discharged home on narcotic analgesia, a muscle relaxant and oral antibiotic, usually  Keflex.  The plan will be to follow up in 2 weeks in the office for review and suture removal.  We will call the patient next week to ensure there are not any problems.  He/she can shower in 72 hours but is instructed to keep the wound dry.  The patient has also been instructed to abstain from smoking or any anti-inflammatories.     Soumya Lester MD, PhD, ELLE

## 2024-05-08 NOTE — OR SURGEON
Immediate Post OP Note    5. PREOPERATIVE DIAGNOSIS:  Postoperative  pseudomeningocele/CSF leak       1.  Status post L2-3 laminectomy for/20 2/2024     2.  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     6. POSTOPERATIVE DIAGNOSIS:  Postoperative  pseudomeningocele/CSF leak       1.  Status post L2-3 laminectomy for/20 2/2024     2.  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      Procedure(s):  INCISION AND DRAINAGE,DURA REPAIR - Wound Class: Clean    Surgeon(s):  Soumya Lester M.D.    Anesthesiologist/Type of Anesthesia:  Anesthesiologist: Tobey Gansert, M.D./General    Surgical Staff:  Assistant: Levar Cazares P.A.-C.  Circulator: Lukas D. Gansert, R.N.  Scrub Person: Aliya Andrade    Specimens removed if any:  * No specimens in log *    Assistants: Levar Cazares PA-C/  ,Specimen: nil    Estimated Blood Loss: 0 cc    Findings: n/a    Complications: nil        5/8/2024 10:24 AM Soumya Lester M.D.

## 2024-05-08 NOTE — CARE PLAN
"Subjective:     CC:   Chief Complaint   Patient presents with   • Annual Exam       HPI:   Hugo Yeh is a 62 y.o. female who presents for annual exam    Patient has GYN provider: Yes  Last Pap Smear:    H/O Abnormal Pap: No  Last Mammogram: UTD  Last Colorectal Cancer Screenin    Exercise: minimal exercise, one hour walking weekly  Diet: Good      No LMP recorded. Patient is postmenopausal.  No significant bloating/fluid retention, pelvic pain, or dyspareunia. No abnormal vaginal discharge.   No breast tenderness, mass, nipple discharge or changes in size or contour.    OB History   No obstetric history on file.      She  has no history on file for sexual activity.    She  has a past medical history of Adrenal mass, left (HCC) (2014), Arthritis, History of gastric bypass (3/25/2016), Obstructive sleep apnea (2014), Ovarian cyst, left (2014), and Pain.  She  has a past surgical history that includes primary c section (, ); cholecystectomy (); endometrial ablation (); gastric bypass laparoscopic (); tubal ligation (Bilateral); and pr total hip arthroplasty (Right, 2021).    Family History   Problem Relation Age of Onset   • No Known Problems Mother    • Stroke Father    • No Known Problems Sister    • No Known Problems Brother    • No Known Problems Daughter    • No Known Problems Daughter    • Alcohol abuse Neg Hx    • Drug abuse Neg Hx      Social History     Tobacco Use   • Smoking status: Former Smoker     Packs/day: 0.25     Years: 20.00     Pack years: 5.00     Types: Cigarettes     Quit date:      Years since quittin.3   • Smokeless tobacco: Never Used   Vaping Use   • Vaping Use: Never used   Substance Use Topics   • Alcohol use: Yes     Comment: 2 per month   • Drug use: Yes     Types: Marijuana, Oral     Comment: \"to sleep\" \"gummies\"       Patient Active Problem List    Diagnosis Date Noted   • Obesity (BMI 30-39.9) 2021   • Primary " The patient is Stable - Low risk of patient condition declining or worsening    Shift Goals  Clinical Goals: prep for surgery, pain control  Patient Goals: comfort, rest  Family Goals: not present    Progress made toward(s) clinical / shift goals:    Problem: Knowledge Deficit - Standard  Goal: Patient and family/care givers will demonstrate understanding of plan of care, disease process/condition, diagnostic tests and medications  Outcome: Progressing     Problem: Pain - Standard  Goal: Alleviation of pain or a reduction in pain to the patient’s comfort goal  Outcome: Progressing  Note: Pt educated to call RN when experiencing pain and rate pain from 0-10.       Patient is not progressing towards the following goals:       "osteoarthritis of right hip 11/22/2013     Current Outpatient Medications   Medication Sig Dispense Refill   • amoxicillin (AMOXIL) 500 MG Cap      • FIBER PO Take 5 mg by mouth. Fiber well gummies     • meloxicam (MOBIC) 15 MG tablet Take 15 mg by mouth as needed.       No current facility-administered medications for this visit.     No Known Allergies    Review of Systems   Constitutional: Negative for fever, chills and malaise/fatigue.   HENT: Negative for congestion.    Eyes: Negative for pain.   Respiratory: Negative for cough and shortness of breath.    Cardiovascular: Negative for chest pain and leg swelling.   Gastrointestinal: Negative for nausea, vomiting, abdominal pain and diarrhea.   Genitourinary: Negative for dysuria and hematuria.   Skin: Negative for rash.   Neurological: Negative for dizziness, focal weakness and headaches.   Endo/Heme/Allergies: Does not bruise/bleed easily.   Psychiatric/Behavioral: Negative for depression.  The patient is not nervous/anxious.      Objective:   /70 (BP Location: Left arm, Patient Position: Sitting, BP Cuff Size: Adult)   Pulse 72   Temp 35.9 °C (96.7 °F) (Temporal)   Ht 1.651 m (5' 5\")   Wt 89.1 kg (196 lb 6.4 oz)   SpO2 96%   Breastfeeding No   BMI 32.68 kg/m²     Wt Readings from Last 4 Encounters:   05/13/21 89.1 kg (196 lb 6.4 oz)   02/22/21 88 kg (194 lb 0.1 oz)   02/12/21 88.5 kg (195 lb)       Physical Exam:  Constitutional: Well-developed and well-nourished. Not diaphoretic. No distress.   Skin: Skin is warm and dry. No rash noted.  Two atypical dark moles on her back.  Head: Atraumatic without lesions.  Eyes: Conjunctivae and extraocular motions are normal. Pupils are equal, round, and reactive to light. No scleral icterus.   Ears:  External ears unremarkable. Tympanic membranes clear and intact.  Nose: Nares patent. Septum midline. Turbinates without erythema nor edema. No discharge.   Mouth/Throat: Tongue normal. Oropharynx is clear and " moist. Posterior pharynx without erythema or exudates.  Neck: Supple, trachea midline. Normal range of motion. No thyromegaly present. No lymphadenopathy--cervical or supraclavicular.  Cardiovascular: Regular rate and rhythm, S1 and S2 without murmur, rubs, or gallops.    Respiratory: Effort normal. Clear to auscultation throughout. No adventitious sounds.   Abdomen: Soft, non tender, and without distention. Active bowel sounds in all four quadrants. No rebound, guarding, masses or HSM.  Extremities: No cyanosis, clubbing, erythema, nor edema. Distal pulses intact and symmetric.   Musculoskeletal: All major joints AROM full in all directions without pain.  Neurological: Alert and oriented x 3. Grossly non-focal. Strength and sensation grossly intact. DTRs 2+/3 and symmetric.   Psychiatric:  Behavior, mood, and affect are appropriate.    Assessment and Plan:     1. Well adult exam  -Advised to increase physical activity as tolerated.  Reviewed diet control    2. Pure hypercholesterolemia  -LDL is mildly elevated.  We will try to gain better control through lifestyle modifications.  The 10-year CVD risk score (D'Agostino, et al., 2008) is: 5%    3. Obesity (BMI 30-39.9)  -Working on lifestyle modifications.    4. Multiple atypical skin moles  -She has multiple atypical moles on her back.  Referral placed to dermatology for yearly skin checks.  - REFERRAL TO DERMATOLOGY    5. Need for vaccination  -Immunization given in clinic today  - Shingles Vaccine (SHINGRIX)      Health maintenance:     Labs per orders  Immunizations per orders  Patient counseled about skin care, diet, supplements, and exercise.  Discussed  breast self exam, mammography screening, diet and exercise, colorectal cancer screening     Follow-up: Return in about 1 year (around 5/13/2022) for Annual PX.

## 2024-05-08 NOTE — OR NURSING
Dr Rondon at bedside to reassess post-op.  Neuro assessment good per MD.  Patient to remain flat with only one pillow for 24 hrs.  Patient updated and all questions answered.  VSS.  Lower back dressing clean and dry. Rodriguez to gravity drainage with adequate clear yellow urine. Cont to monitor.  Update wife with patient status and plan

## 2024-05-08 NOTE — ANESTHESIA POSTPROCEDURE EVALUATION
Patient: Alec Cruz    Procedure Summary       Date: 05/08/24 Room / Location: [facility] 07 / SURGERY [facility]    Anesthesia Start: 0853 Anesthesia Stop: 1015    Procedure: INCISION AND DRAINAGE,DURA REPAIR (Spine Lumbar) Diagnosis:       Postoperative CSF leak      (Postoperative CSF leak)    Surgeons: Soumya Lester M.D. Responsible Provider: Tobey Gansert, M.D.    Anesthesia Type: general ASA Status: 2            Final Anesthesia Type: general  Last vitals  BP   Blood Pressure : 125/72    Temp   36.9 °C (98.4 °F)    Pulse   91   Resp   20    SpO2   94 %      Anesthesia Post Evaluation    Patient location during evaluation: PACU  Patient participation: complete - patient participated  Level of consciousness: awake and alert    Airway patency: patent  Anesthetic complications: no  Cardiovascular status: hemodynamically stable  Respiratory status: acceptable  Hydration status: euvolemic    PONV: none          No notable events documented.     Nurse Pain Score: 3 (NPRS)

## 2024-05-08 NOTE — ANESTHESIA PROCEDURE NOTES
Airway    Date/Time: 5/8/2024 8:58 AM    Performed by: Tobey Gansert, M.D.  Authorized by: Tobey Gansert, M.D.    Location:  OR  Urgency:  Elective  Indications for Airway Management:  Anesthesia      Spontaneous Ventilation: absent    Sedation Level:  Deep  Preoxygenated: Yes    Patient Position:  Sniffing  Final Airway Type:  Endotracheal airway  Final Endotracheal Airway:  ETT  Cuffed: Yes    Technique Used for Successful ETT Placement:  Direct laryngoscopy    Insertion Site:  Oral  Blade Type:  Britni  Laryngoscope Blade/Videolaryngoscope Blade Size:  3  ETT Size (mm):  8.0  Measured from:  Teeth  ETT to Teeth (cm):  24  Placement Verified by: auscultation and capnometry    Cormack-Lehane Classification:  Grade I - full view of glottis  Number of Attempts at Approach:  1

## 2024-05-08 NOTE — PROGRESS NOTES
Postop check-in scene with Dr. Lester.  Patient is resting comfortably. He has a fully placed. He is on bedrest and his flat. He denies headache.    Physical exam:  Motor strength, 5/5  Wound dry:  Routine operative instructions.  Slowly set up as long as your remains free of headache.

## 2024-05-08 NOTE — ANESTHESIA TIME REPORT
Anesthesia Start and Stop Event Times       Date Time Event    5/8/2024 0837 Ready for Procedure     0853 Anesthesia Start     1015 Anesthesia Stop          Responsible Staff  05/08/24      Name Role Begin End    Tobey Gansert, M.D. Anesth 0853 1015          Overtime Reason:  no overtime (within assigned shift)    Comments:

## 2024-05-08 NOTE — PROGRESS NOTES
Pt arrived to unit. Post-op vitals initiated. Jennifer present. Pt and pt's wife educated on call light, bed rest restriction, pain management regimen.

## 2024-05-08 NOTE — ANESTHESIA PREPROCEDURE EVALUATION
Case: [id number] Date/Time: 05/08/24 0915    Procedure: INCISION AND DRAINAGE,DURA REPAIR (Spine Lumbar) - Time: 1 hrs. FIRST CASE WED. He's in patient.    Anesthesia type: General    Diagnosis: Postoperative CSF leak [G97.82, G96.00]    Pre-op diagnosis: Postoperative CSF leak    Location: [facility] OR 07 / SURGERY [facility]    Surgeons: Soumya Lester M.D.            Relevant Problems   CARDIAC   (positive) HTN (hypertension)       Physical Exam    Airway   Mallampati: II  TM distance: >3 FB  Neck ROM: full       Cardiovascular - normal exam  Rhythm: regular  Rate: normal  (-) murmur     Dental - normal exam           Pulmonary - normal exam  Breath sounds clear to auscultation     Abdominal    Neurological - normal exam                   Anesthesia Plan    ASA 2       Plan - general       Airway plan will be ETT          Induction: intravenous    Postoperative Plan: Postoperative administration of opioids is intended.    Pertinent diagnostic labs and testing reviewed    Informed Consent:    Anesthetic plan and risks discussed with patient.    Use of blood products discussed with: patient whom consented to blood products.

## 2024-05-08 NOTE — PROGRESS NOTES
UPDATE TO HISTORY AND PHYSICAL    I met with patient and any family members in preop. Again discussed planned surgery. I went over the risks, benefits and alternatives of the surgery in the office visit. I had discussed any off label use of products. The patient had been given literature to read about the procedure in person and via email if able and had access to the office note via the patient portal. I Answered any questions remaining from prior visits.     There was no change to my last H and P (it may be labelled a progress note or a H and P in EPIC).   The note was made by either myself, or my PAs Levar Cazares or Boris Piper but is signed by me, if it was done by my physician assistant.   I verify it is correct and has my co-signature.    Soumya Lester MD PhD ELLE

## 2024-05-08 NOTE — PROGRESS NOTES
Assessment completed.  Pt A&Ox4.  Pt complains of 3/10 pain, medicated per MAR. POC discussed; communication board updated.    Bed in locked, lowest position.  Call light and belongings within reach.  Needs met.

## 2024-05-09 ENCOUNTER — PHARMACY VISIT (OUTPATIENT)
Dept: PHARMACY | Facility: MEDICAL CENTER | Age: 79
End: 2024-05-09
Payer: COMMERCIAL

## 2024-05-09 VITALS
BODY MASS INDEX: 24.42 KG/M2 | HEART RATE: 70 BPM | RESPIRATION RATE: 16 BRPM | WEIGHT: 170.19 LBS | TEMPERATURE: 98.5 F | OXYGEN SATURATION: 93 % | DIASTOLIC BLOOD PRESSURE: 80 MMHG | SYSTOLIC BLOOD PRESSURE: 128 MMHG

## 2024-05-09 PROBLEM — Z71.89 ACP (ADVANCE CARE PLANNING): Status: RESOLVED | Noted: 2024-05-07 | Resolved: 2024-05-09

## 2024-05-09 PROBLEM — T88.8XXA FLUID COLLECTION AT SURGICAL SITE: Status: RESOLVED | Noted: 2024-05-07 | Resolved: 2024-05-09

## 2024-05-09 PROBLEM — G97.82 POSTOPERATIVE CSF LEAK: Status: RESOLVED | Noted: 2024-05-07 | Resolved: 2024-05-09

## 2024-05-09 PROBLEM — G96.00 POSTOPERATIVE CSF LEAK: Status: RESOLVED | Noted: 2024-05-07 | Resolved: 2024-05-09

## 2024-05-09 PROBLEM — G97.82: Status: RESOLVED | Noted: 2024-05-07 | Resolved: 2024-05-09

## 2024-05-09 LAB
BACTERIA UR CULT: NORMAL
SIGNIFICANT IND 70042: NORMAL
SITE SITE: NORMAL
SOURCE SOURCE: NORMAL

## 2024-05-09 PROCEDURE — RXMED WILLOW AMBULATORY MEDICATION CHARGE: Performed by: INTERNAL MEDICINE

## 2024-05-09 PROCEDURE — 99239 HOSP IP/OBS DSCHRG MGMT >30: CPT | Performed by: INTERNAL MEDICINE

## 2024-05-09 PROCEDURE — 99024 POSTOP FOLLOW-UP VISIT: CPT | Performed by: PHYSICIAN ASSISTANT

## 2024-05-09 RX ORDER — OXYCODONE HYDROCHLORIDE 5 MG/1
5 TABLET ORAL EVERY 6 HOURS PRN
Qty: 20 TABLET | Refills: 0 | Status: SHIPPED | OUTPATIENT
Start: 2024-05-09 | End: 2024-05-14

## 2024-05-09 RX ORDER — BUTALBITAL, ACETAMINOPHEN AND CAFFEINE 50; 325; 40 MG/1; MG/1; MG/1
1 TABLET ORAL EVERY 6 HOURS PRN
Qty: 20 TABLET | Refills: 0 | Status: SHIPPED | OUTPATIENT
Start: 2024-05-09 | End: 2024-05-14

## 2024-05-09 RX ORDER — TAMSULOSIN HYDROCHLORIDE 0.4 MG/1
0.4 CAPSULE ORAL
Status: DISCONTINUED | OUTPATIENT
Start: 2024-05-09 | End: 2024-05-09 | Stop reason: HOSPADM

## 2024-05-09 RX ADMIN — LISINOPRIL 5 MG: 2.5 TABLET ORAL at 05:38

## 2024-05-09 RX ADMIN — ACETAMINOPHEN 650 MG: 325 TABLET, FILM COATED ORAL at 05:38

## 2024-05-09 RX ADMIN — ACETAMINOPHEN 650 MG: 325 TABLET, FILM COATED ORAL at 11:46

## 2024-05-09 RX ADMIN — BUTALBITAL, ACETAMINOPHEN AND CAFFEINE 1 TABLET: 325; 50; 40 TABLET ORAL at 05:38

## 2024-05-09 ASSESSMENT — COGNITIVE AND FUNCTIONAL STATUS - GENERAL
SUGGESTED CMS G CODE MODIFIER MOBILITY: CJ
HELP NEEDED FOR BATHING: A LITTLE
MOVING TO AND FROM BED TO CHAIR: A LITTLE
SUGGESTED CMS G CODE MODIFIER DAILY ACTIVITY: CI
STANDING UP FROM CHAIR USING ARMS: A LITTLE
DAILY ACTIVITIY SCORE: 23
CLIMB 3 TO 5 STEPS WITH RAILING: A LITTLE
MOBILITY SCORE: 20
WALKING IN HOSPITAL ROOM: A LITTLE

## 2024-05-09 ASSESSMENT — ACTIVITIES OF DAILY LIVING (ADL): TOILETING: INDEPENDENT

## 2024-05-09 ASSESSMENT — GAIT ASSESSMENTS
DEVIATION: NO DEVIATION
ASSISTIVE DEVICE: NONE;FRONT WHEEL WALKER
DISTANCE (FEET): 125
GAIT LEVEL OF ASSIST: SUPERVISED
DISTANCE (FEET): 15

## 2024-05-09 ASSESSMENT — PAIN DESCRIPTION - PAIN TYPE
TYPE: SURGICAL PAIN
TYPE: ACUTE PAIN;SURGICAL PAIN

## 2024-05-09 NOTE — THERAPY
Occupational Therapy Contact Note    Patient Name: Alec Cruz  Age:  79 y.o., Sex:  male  Medical Record #: [id number]  Today's Date: 5/9/2024 05/09/24 0821   Interdisciplinary Plan of Care Collaboration   Collaboration Comments OT referral received. Pt on bed rest due to dural tear. Will complete OT eval once pt cleared and appropriate for OOB activity.

## 2024-05-09 NOTE — THERAPY
Physical Therapy   Initial Evaluation     Patient Name: Alec Cruz  Age:  79 y.o., Sex:  male  Medical Record #: [id number]  Today's Date: 5/9/2024     Precautions  Precautions: Spinal / Back Precautions  (no brace ordered)    Assessment    79 y.o. male presented to the hospital with complaints of a headache x4 days s/p L2-L3 lamintactomy and swelling at the surgical site.  Pt was found to have a dura tear and underwent surgery to repair it.  Pt is now off bed rest and cleared to work with therapy.  Pt lives with his wife in a 1SH, 1 step and was independent for mobility without an AD.  He presents with good strength, a decrease in the headache, intact sensation and SPV for mobility without an AD.  His wife will be home to assist him as needed.  Pt and his wife were re-educated on his spinal precautions.  He has no further acute PT needs.  DC PT services.  Recommend outpatient PT services if deemed appropriate by his surgeon at follow up.    Plan    Physical Therapy Initial Treatment Plan   Duration: Discharge Needs Only    DC Equipment Recommendations: None  Discharge Recommendations: Recommend outpatient physical therapy services to address higher level deficits (if deemed approrpriate by his surgeon at follow up)            Objective       05/09/24 1142   Initial Contact Note    Initial Contact Note Order Received and Verified, Physical Therapy Evaluation in Progress with Full Report to Follow.   Precautions   Precautions Spinal / Back Precautions   (no brace ordered)   Vitals   O2 Delivery Device None - Room Air   Pain 0 - 10 Group   Therapist Pain Assessment Post Activity Pain Same as Prior to Activity;3  (headache)   Prior Living Situation   Housing / Facility 1 Story House   Steps Into Home 1   Rail Right Rail (Steps into Home)   Bathroom Set up Walk In Shower   Equipment Owned Front-Wheel Walker   Lives with - Patient's Self Care Capacity Spouse  (wife reports she waorks but can be home with her  and  available to her work by phone)   Prior Level of Functional Mobility   Bed Mobility Independent   Transfer Status Independent   Ambulation Independent   Ambulation Distance community   Assistive Devices Used None   Stairs Independent   Comments drives, works part time doing maintenance   History of Falls   History of Falls No   Cognition    Cognition / Consciousness WDL   Level of Consciousness Alert   Active ROM Lower Body    Active ROM Lower Body  WDL   Strength Lower Body   Lower Body Strength  WDL   Sensation Lower Body   Lower Extremity Sensation   WDL   Coordination Lower Body    Coordination Lower Body  WDL   Balance Assessment   Sitting Balance (Static) Fair +   Sitting Balance (Dynamic) Fair +   Standing Balance (Static) Fair +   Standing Balance (Dynamic) Fair   Weight Shift Sitting Good   Weight Shift Standing Good   Bed Mobility    Supine to Sit Modified Independent   Scooting Independent   Rolling Modified Independent   Comments bed flat, no rail, lofg rolling /s cueing   Gait Analysis   Gait Level Of Assist Supervised   Assistive Device None;Front Wheel Walker   Distance (Feet) 125   # of Times Distance was Traveled 2   Deviation No deviation   Weight Bearing Status FWB BLEs   Functional Mobility   Sit to Stand Supervised   Bed, Chair, Wheelchair Transfer Supervised   Transfer Method Stand Step   Mobility /c FWW, /s AD   Activity Tolerance   Sitting Edge of Bed post session   Edema / Skin Assessment   Edema / Skin  Not Assessed   Education Group   Education Provided Role of Physical Therapist   Role of Physical Therapist Patient Response Patient;Acceptance;Explanation;Action Demonstration   Physical Therapy Initial Treatment Plan    Duration Discharge Needs Only   Anticipated Discharge Equipment and Recommendations   DC Equipment Recommendations None   Discharge Recommendations Recommend outpatient physical therapy services to address higher level deficits  (if deemed approrpriate by his surgeon at  follow up)   Interdisciplinary Plan of Care Collaboration   IDT Collaboration with  Nursing   Patient Position at End of Therapy Edge of Bed;Call Light within Reach;Tray Table within Reach;Family / Friend in Room  (wife in the room)   Collaboration Comments   (PT recommendations)   Session Information   Date / Session Number  5/9- eval only

## 2024-05-09 NOTE — PROGRESS NOTES
1000 Patient ambulatory to bathroom SBA w/FWW. No complaints of HA throughout.     1030 Rodriguez removed per order.    1245 Patient + void in bathroom.

## 2024-05-09 NOTE — PROGRESS NOTES
Neurosurgery Progress Note    Subjective:  POD #1 seen with Dr. Lester  Pain controlled w/ orals  On Bed rest  Rodriguez placed  Eating and Drinking   No N/V  Denies H/A    Exam:  Wound: C/d/I  VSS  LE motor 5/5 throughout bilterally    BP  Min: 98/72  Max: 139/82  Pulse  Av.7  Min: 55  Max: 101  Resp  Avg: 15.7  Min: 13  Max: 20  Temp  Av.5 °C (97.7 °F)  Min: 36.1 °C (97 °F)  Max: 37.2 °C (99 °F)  SpO2  Av.4 %  Min: 92 %  Max: 98 %    No data recorded    Recent Labs     24  0153   WBC 8.8   RBC 4.68*   HEMOGLOBIN 14.3   HEMATOCRIT 41.3*   MCV 88.2   MCH 30.6   MCHC 34.6   RDW 42.2   PLATELETCT 246   MPV 9.5     Recent Labs     24  0153   SODIUM 139   POTASSIUM 3.9   CHLORIDE 105   CO2 23   GLUCOSE 102*   BUN 20   CREATININE 0.82   CALCIUM 8.9     Recent Labs     24  0153   INR 1.07           Intake/Output                         24 07 - 24 0659 24 07 - 05/10/24 0659     [id number] 1425-3825 Total 1333-0224 5350-8997 Total                 Intake    Total Intake -- -- -- -- -- --       Output    Urine  1700  400 2100  --  -- --    Urine 1000 -- 1000 -- -- --    Number of Times Voided 1 x -- 1 x -- -- --    Urine Void (mL) 500 -- 500 -- -- --    Output (mL) (Urethral Catheter Non-latex;Temperature probe 16 Fr.) 200 400 600 -- -- --    Blood  30  -- 30  --  -- --    Est. Blood Loss 30 -- 30 -- -- --    Total Output 7832 850 5297 -- -- --       Net I/O     -1730 -400 -2130 -- -- --              Intake/Output Summary (Last 24 hours) at 2024 0805  Last data filed at 2024 0500  Gross per 24 hour   Intake --   Output 2130 ml   Net -2130 ml             LR  500 mL Once PRN    labetalol  10 mg Q4HRS PRN    ondansetron  4 mg Q4HRS PRN    ondansetron  4 mg Q4HRS PRN    senna-docusate  2 Tablet Q EVENING    And    polyethylene glycol/lytes  1 Packet QDAY PRN    ipratropium-albuterol  3 mL Q4H PRN (RT)    lisinopril  5 mg DAILY    LORazepam  0.5 mg 1X PRN    Pharmacy  Consult Request  1 Each PHARMACY TO DOSE    acetaminophen  650 mg Q6HRS    Followed by    [START ON 5/12/2024] acetaminophen  650 mg Q6HRS PRN    oxyCODONE immediate-release  2.5 mg Q3HRS PRN    Or    oxyCODONE immediate-release  5 mg Q3HRS PRN    Or    HYDROmorphone  0.25 mg Q3HRS PRN    methocarbamol  750 mg 4X/DAY PRN    butalbital/apap/caffeine  1 Tablet Q6HRS PRN       Assessment and Plan:  Hospital day #3  POD #1    Anticipate that he will be clear for discharge later today.     Plan:  OFF of bed rest  Slowly sit up and mobilize  Work with PT/OT mid day today  D/C gates once up and mobilizing  If no positional h/a he is clear to d/c home this afternoon from [facility].

## 2024-05-09 NOTE — PROGRESS NOTES
Alec Cruz has been provided discharge instructions, to include follow up care, home medications, and activity/diet reviewed. Understanding verbalized. IV removed. Catheter tip intact, bleeding controlled. Arm band removed. Medications to be given from pharmacy. Pt ride present. Pt out of DCL at this time with personal belongings.

## 2024-05-09 NOTE — DISCHARGE SUMMARY
Discharge Summary    CHIEF COMPLAINT ON ADMISSION  No chief complaint on file.      Reason for Admission  POST OPERATIVE COMPLICATION     Admission Date  5/7/2024    CODE STATUS  Full Code    HPI & HOSPITAL COURSE  79 y.o. male with history of recent L2-L3 laminectomy by Dr. Lester 4/22/2024 who presented 5/7/2024 due to positional headache.  Patient denied recent fever, chills, traumatic injury to surgical site, no loss of bladder and/or bowel incontinence.  Earlier yesterday, patient's wife noted swelling at the surgical site, became concerned and patient went to ER for further evaluation.     Workup from [facility] ER included:  CBC: WBC 10.6, hemoglobin 14.1, hematocrit 42.1, platelet 253  CMP: Sodium 136, potassium 4.0, chloride 105, bicarb 23, alkaline phosphatase 60, AST 22, ALT 32, BUN 22, glucose 124, creatinine 1.1, calcium 9.2, total protein 7.3, albumin 4.2, total bilirubin 0.5  CRP 0.28  Procalcitonin 0.053     CT lumbar spine: There is approximately 6.6 x 0.1 cm fluid collection in the laminectomy bed extending to was the surgical wound.  There is some heterogeneous enhancement within this collection.  May represent active venous hemorrhage.  Recommend surgical consultation     Case was discussed with Dr. Haynes, who recommended patient be transferred to [facility] for evaluation with Dr. Lester. It was felt that possible CSF leak, rather than active hemorrhage or abscess.  No signs of SIRS, no leukocytosis, WNL inflammatory markers, therefore no antibiotic given.     Patient underwent MRI L-spine with and without IV contrast that revealed postoperative changes of laminectomy at L2-L3 with a fluid collection at the laminectomy site that extends into the right posterior lateral epidural space and causes mild mass effect upon the thecal sac which is displaced slightly anteriorly and to the left of midline.  However there is no significant cauda equina compression.  This could represent a CSF leak or  seroma.  Patient was evaluated by Dr. Lester and was taken to the OR on 5/8 for postoperative pseudomeningocele/CSF leak and underwent incision and drainage with dura repair.  Postop the patient was doing well and his pain was well-controlled.  He was able to ambulate without difficulty.  Patient will be discharged home with close outpatient follow-up with neurosurgery and his PCP.    Therefore, he is discharged in good and stable condition to home with close outpatient follow-up.    The patient met 2-midnight criteria for an inpatient stay at the time of discharge.    Discharge Date  5/9/24    FOLLOW UP ITEMS POST DISCHARGE  PCP    DISCHARGE DIAGNOSES  Principal Problem (Resolved):    Postoperative surgical complication involving nervous system associated with nervous system procedure, unspecified complication (POA: Yes)  Active Problems:    BPH (benign prostatic hyperplasia) (Chronic) (POA: Yes)    HTN (hypertension) (Chronic) (POA: Yes)    Hyperlipemia (Chronic) (POA: Yes)      Overview: High HDL    Neurogenic claudication due to lumbar spinal stenosis (POA: Yes)  Resolved Problems:    ACP (advance care planning) (POA: Unknown)    Fluid collection at surgical site (POA: Unknown)    Postoperative CSF leak (POA: Unknown)      FOLLOW UP  Future Appointments   Date Time Provider Department Center   5/23/2024 12:00 PM YUNIOR Mckeon [facility]   6/11/2024  4:00 PM YUNIOR Mckeon [facility]   6/25/2024 12:30 PM YUNIOR Mckeon [facility]     No follow-up provider specified.    MEDICATIONS ON DISCHARGE     Medication List        START taking these medications        Instructions   butalbital/apap/caffeine -40 mg Tabs  Commonly known as: Fioricet   Take 1 Tablet by mouth every 6 hours as needed for Headache for up to 5 days.  Dose: 1 Tablet     oxyCODONE immediate-release 5 MG Tabs  Commonly known as: Roxicodone   Take 1 Tablet by mouth every 6 hours as needed  for Severe Pain for up to 5 days.  Dose: 5 mg            CHANGE how you take these medications        Instructions   lisinopril 5 MG Tabs  What changed: Another medication with the same name was removed. Continue taking this medication, and follow the directions you see here.  Commonly known as: Prinivil   Take 1 Tab by mouth every day.  Dose: 5 mg            CONTINUE taking these medications        Instructions   acyclovir 400 MG tablet  Commonly known as: Zovirax   Take 1 Tab by mouth 2 times a day.  Dose: 400 mg     finasteride 5 MG Tabs  Commonly known as: Proscar   Take 1 Tab by mouth every day.  Dose: 5 mg     HYDROcodone-acetaminophen 5-325 MG Tabs per tablet  Commonly known as: Norco   Take 1-2 Tabs by mouth every four hours as needed.  Dose: 1-2 Tablet     meloxicam 15 MG tablet  Commonly known as: Mobic   Take 1 Tablet by mouth 1/2 hour after breakfast for 540 doses. Take one  tablet with breakfast as needed for pain. No advil/aleve/motrin/ibuprofen on same day.  Dose: 15 mg     methylPREDNISolone 4 MG Tbpk  Commonly known as: Medrol Dosepak   Follow schedule on package instructions.     ondansetron 4 MG Tbdp  Commonly known as: Zofran ODT   Take 2 Tablets by mouth every 6 hours as needed for Nausea/Vomiting.  Dose: 8 mg     scopolamine 1 mg/72hr Pt72  Commonly known as: Transderm-Scop   Place 1 Patch on the skin every 72 hours.  Dose: 1 Patch     tamsulosin 0.4 MG capsule  Commonly known as: Flomax   Take 1 Cap by mouth ONE-HALF HOUR AFTER BREAKFAST.  Dose: 0.4 mg              Allergies  Allergies   Allergen Reactions    Other Misc      norcal       DIET  Orders Placed This Encounter   Procedures    Diet Order Diet: Regular     Standing Status:   Standing     Number of Occurrences:   1     Order Specific Question:   Diet:     Answer:   Regular [1]       ACTIVITY  As tolerated.  Weight bearing as tolerated    CONSULTATIONS  none    PROCEDURES  none    LABORATORY  Lab Results   Component Value Date     SODIUM 139 05/07/2024    POTASSIUM 3.9 05/07/2024    CHLORIDE 105 05/07/2024    CO2 23 05/07/2024    GLUCOSE 102 (H) 05/07/2024    BUN 20 05/07/2024    CREATININE 0.82 05/07/2024        Lab Results   Component Value Date    WBC 8.8 05/07/2024    HEMOGLOBIN 14.3 05/07/2024    HEMATOCRIT 41.3 (L) 05/07/2024    PLATELETCT 246 05/07/2024        Total time of the discharge process exceeds 32 minutes.

## 2024-05-09 NOTE — CARE PLAN
The patient is Stable - Low risk of patient condition declining or worsening    Shift Goals  Clinical Goals: pain mgmt, bed rest  Patient Goals: pain control, rest, comfort  Family Goals: updates    Progress made toward(s) clinical / shift goals:        Problem: Knowledge Deficit - Standard  Goal: Patient and family/care givers will demonstrate understanding of plan of care, disease process/condition, diagnostic tests and medications  Outcome: Progressing     Problem: Hemodynamics  Goal: Patient's hemodynamics, fluid balance and neurologic status will be stable or improve  Outcome: Progressing     Problem: Respiratory  Goal: Patient will achieve/maintain optimum respiratory ventilation and gas exchange  Outcome: Progressing     Problem: Pain - Standard  Goal: Alleviation of pain or a reduction in pain to the patient’s comfort goal  Outcome: Progressing     Problem: Incision Care  Goal: Optimal post surgical incision care  Outcome: Progressing       Patient is not progressing towards the following goals:

## 2024-05-12 LAB
BACTERIA BLD CULT: NORMAL
BACTERIA BLD CULT: NORMAL
SIGNIFICANT IND 70042: NORMAL
SIGNIFICANT IND 70042: NORMAL
SITE SITE: NORMAL
SITE SITE: NORMAL
SOURCE SOURCE: NORMAL
SOURCE SOURCE: NORMAL

## 2024-05-21 PROBLEM — G96.00 POSTOPERATIVE CSF LEAK: Status: ACTIVE | Noted: 2024-05-21

## 2024-05-21 PROBLEM — G97.82 POSTOPERATIVE CSF LEAK: Status: ACTIVE | Noted: 2024-05-21

## 2024-05-22 ENCOUNTER — APPOINTMENT (OUTPATIENT)
Dept: ADMISSIONS | Facility: MEDICAL CENTER | Age: 79
End: 2024-05-22
Attending: NEUROLOGICAL SURGERY
Payer: COMMERCIAL

## 2024-05-22 RX ORDER — ACETAMINOPHEN 500 MG
500-1000 TABLET ORAL PRN
COMMUNITY

## 2024-05-22 RX ORDER — ATORVASTATIN CALCIUM 10 MG/1
10 TABLET, FILM COATED ORAL
COMMUNITY
Start: 2024-04-04

## 2024-05-22 RX ORDER — BACLOFEN 5 MG/1
5 TABLET ORAL 3 TIMES DAILY PRN
COMMUNITY
Start: 2024-04-22

## 2024-05-29 ENCOUNTER — ANESTHESIA (OUTPATIENT)
Dept: SURGERY | Facility: MEDICAL CENTER | Age: 79
End: 2024-05-29
Payer: COMMERCIAL

## 2024-05-29 ENCOUNTER — ANESTHESIA EVENT (OUTPATIENT)
Dept: SURGERY | Facility: MEDICAL CENTER | Age: 79
End: 2024-05-29
Payer: COMMERCIAL

## 2024-05-29 ENCOUNTER — HOSPITAL ENCOUNTER (INPATIENT)
Facility: MEDICAL CENTER | Age: 79
LOS: 1 days | End: 2024-05-30
Attending: NEUROLOGICAL SURGERY | Admitting: NEUROLOGICAL SURGERY
Payer: COMMERCIAL

## 2024-05-29 DIAGNOSIS — R51.9 NONINTRACTABLE EPISODIC HEADACHE, UNSPECIFIED HEADACHE TYPE: ICD-10-CM

## 2024-05-29 PROCEDURE — 700111 HCHG RX REV CODE 636 W/ 250 OVERRIDE (IP): Mod: JZ | Performed by: NEUROLOGICAL SURGERY

## 2024-05-29 PROCEDURE — A9270 NON-COVERED ITEM OR SERVICE: HCPCS | Performed by: PHYSICIAN ASSISTANT

## 2024-05-29 PROCEDURE — 160048 HCHG OR STATISTICAL LEVEL 1-5: Performed by: NEUROLOGICAL SURGERY

## 2024-05-29 PROCEDURE — 700101 HCHG RX REV CODE 250: Performed by: PHYSICIAN ASSISTANT

## 2024-05-29 PROCEDURE — 160002 HCHG RECOVERY MINUTES (STAT): Performed by: NEUROLOGICAL SURGERY

## 2024-05-29 PROCEDURE — 700101 HCHG RX REV CODE 250: Performed by: NEUROLOGICAL SURGERY

## 2024-05-29 PROCEDURE — 700102 HCHG RX REV CODE 250 W/ 637 OVERRIDE(OP): Performed by: PHYSICIAN ASSISTANT

## 2024-05-29 PROCEDURE — 63707 REPAIR SPINAL FLUID LEAKAGE: CPT | Mod: 78 | Performed by: NEUROLOGICAL SURGERY

## 2024-05-29 PROCEDURE — 770001 HCHG ROOM/CARE - MED/SURG/GYN PRIV*

## 2024-05-29 PROCEDURE — 700111 HCHG RX REV CODE 636 W/ 250 OVERRIDE (IP): Performed by: PHYSICIAN ASSISTANT

## 2024-05-29 PROCEDURE — 160041 HCHG SURGERY MINUTES - EA ADDL 1 MIN LEVEL 4: Performed by: NEUROLOGICAL SURGERY

## 2024-05-29 PROCEDURE — 160035 HCHG PACU - 1ST 60 MINS PHASE I: Performed by: NEUROLOGICAL SURGERY

## 2024-05-29 PROCEDURE — 160009 HCHG ANES TIME/MIN: Performed by: NEUROLOGICAL SURGERY

## 2024-05-29 PROCEDURE — 110371 HCHG SHELL REV 272: Performed by: NEUROLOGICAL SURGERY

## 2024-05-29 PROCEDURE — 110454 HCHG SHELL REV 250: Performed by: NEUROLOGICAL SURGERY

## 2024-05-29 PROCEDURE — 00QT0ZZ REPAIR SPINAL MENINGES, OPEN APPROACH: ICD-10-PCS | Performed by: NEUROLOGICAL SURGERY

## 2024-05-29 PROCEDURE — 700105 HCHG RX REV CODE 258: Performed by: PHYSICIAN ASSISTANT

## 2024-05-29 PROCEDURE — 700106 HCHG RX REV CODE 271: Performed by: NEUROLOGICAL SURGERY

## 2024-05-29 PROCEDURE — 160029 HCHG SURGERY MINUTES - 1ST 30 MINS LEVEL 4: Performed by: NEUROLOGICAL SURGERY

## 2024-05-29 PROCEDURE — 63707 REPAIR SPINAL FLUID LEAKAGE: CPT | Mod: ASROC,78 | Performed by: PHYSICIAN ASSISTANT

## 2024-05-29 DEVICE — GRAFT DURAMATRIX ONLAY PLUS 1IN X 1IN OR 2.7CM X 2.75CM: Type: IMPLANTABLE DEVICE | Site: SPINE LUMBAR | Status: FUNCTIONAL

## 2024-05-29 DEVICE — IMPLANTABLE DEVICE: Type: IMPLANTABLE DEVICE | Site: SPINE LUMBAR | Status: FUNCTIONAL

## 2024-05-29 RX ORDER — METHYLPREDNISOLONE 4 MG/1
4 TABLET ORAL SEE ADMIN INSTRUCTIONS
COMMUNITY

## 2024-05-29 RX ORDER — HYDROMORPHONE HYDROCHLORIDE 1 MG/ML
0.25 INJECTION, SOLUTION INTRAMUSCULAR; INTRAVENOUS; SUBCUTANEOUS
Status: DISCONTINUED | OUTPATIENT
Start: 2024-05-29 | End: 2024-05-30 | Stop reason: HOSPADM

## 2024-05-29 RX ORDER — OXYCODONE HCL 5 MG/5 ML
10 SOLUTION, ORAL ORAL
Status: DISCONTINUED | OUTPATIENT
Start: 2024-05-29 | End: 2024-05-29 | Stop reason: HOSPADM

## 2024-05-29 RX ORDER — EPHEDRINE SULFATE 50 MG/ML
5 INJECTION, SOLUTION INTRAVENOUS
Status: DISCONTINUED | OUTPATIENT
Start: 2024-05-29 | End: 2024-05-29 | Stop reason: HOSPADM

## 2024-05-29 RX ORDER — LABETALOL HYDROCHLORIDE 5 MG/ML
10 INJECTION, SOLUTION INTRAVENOUS
Status: DISCONTINUED | OUTPATIENT
Start: 2024-05-29 | End: 2024-05-30 | Stop reason: HOSPADM

## 2024-05-29 RX ORDER — DIPHENHYDRAMINE HCL 25 MG
25 TABLET ORAL EVERY 6 HOURS PRN
Status: DISCONTINUED | OUTPATIENT
Start: 2024-05-29 | End: 2024-05-30 | Stop reason: HOSPADM

## 2024-05-29 RX ORDER — ALPRAZOLAM 0.25 MG/1
0.25 TABLET ORAL 2 TIMES DAILY PRN
Status: DISCONTINUED | OUTPATIENT
Start: 2024-05-29 | End: 2024-05-30 | Stop reason: HOSPADM

## 2024-05-29 RX ORDER — HYDROMORPHONE HYDROCHLORIDE 1 MG/ML
0.2 INJECTION, SOLUTION INTRAMUSCULAR; INTRAVENOUS; SUBCUTANEOUS
Status: DISCONTINUED | OUTPATIENT
Start: 2024-05-29 | End: 2024-05-29 | Stop reason: HOSPADM

## 2024-05-29 RX ORDER — BUTALBITAL, ACETAMINOPHEN AND CAFFEINE 50; 325; 40 MG/1; MG/1; MG/1
1 TABLET ORAL EVERY 4 HOURS PRN
Status: DISCONTINUED | OUTPATIENT
Start: 2024-05-29 | End: 2024-05-30 | Stop reason: HOSPADM

## 2024-05-29 RX ORDER — ONDANSETRON 2 MG/ML
INJECTION INTRAMUSCULAR; INTRAVENOUS PRN
Status: DISCONTINUED | OUTPATIENT
Start: 2024-05-29 | End: 2024-05-29 | Stop reason: SURG

## 2024-05-29 RX ORDER — ONDANSETRON 2 MG/ML
4 INJECTION INTRAMUSCULAR; INTRAVENOUS EVERY 4 HOURS PRN
Status: DISCONTINUED | OUTPATIENT
Start: 2024-05-29 | End: 2024-05-30 | Stop reason: HOSPADM

## 2024-05-29 RX ORDER — ONDANSETRON 4 MG/1
4 TABLET, ORALLY DISINTEGRATING ORAL EVERY 6 HOURS PRN
COMMUNITY

## 2024-05-29 RX ORDER — ONDANSETRON 2 MG/ML
4 INJECTION INTRAMUSCULAR; INTRAVENOUS
Status: DISCONTINUED | OUTPATIENT
Start: 2024-05-29 | End: 2024-05-29 | Stop reason: HOSPADM

## 2024-05-29 RX ORDER — ACETAMINOPHEN 500 MG
1000 TABLET ORAL EVERY 6 HOURS
Status: DISCONTINUED | OUTPATIENT
Start: 2024-05-29 | End: 2024-05-30 | Stop reason: HOSPADM

## 2024-05-29 RX ORDER — OXYCODONE HYDROCHLORIDE 5 MG/1
5 TABLET ORAL
Status: DISCONTINUED | OUTPATIENT
Start: 2024-05-29 | End: 2024-05-30 | Stop reason: HOSPADM

## 2024-05-29 RX ORDER — CEFAZOLIN SODIUM 1 G/3ML
2 INJECTION, POWDER, FOR SOLUTION INTRAMUSCULAR; INTRAVENOUS ONCE
Status: COMPLETED | OUTPATIENT
Start: 2024-05-29 | End: 2024-05-29

## 2024-05-29 RX ORDER — PHENYLEPHRINE HCL IN 0.9% NACL 1 MG/10 ML
SYRINGE (ML) INTRAVENOUS PRN
Status: DISCONTINUED | OUTPATIENT
Start: 2024-05-29 | End: 2024-05-29 | Stop reason: SURG

## 2024-05-29 RX ORDER — AMOXICILLIN 250 MG
1 CAPSULE ORAL
Status: DISCONTINUED | OUTPATIENT
Start: 2024-05-29 | End: 2024-05-30 | Stop reason: HOSPADM

## 2024-05-29 RX ORDER — ACETAMINOPHEN 500 MG
500-1000 TABLET ORAL PRN
Status: DISCONTINUED | OUTPATIENT
Start: 2024-05-29 | End: 2024-05-29

## 2024-05-29 RX ORDER — HALOPERIDOL 5 MG/ML
1 INJECTION INTRAMUSCULAR
Status: DISCONTINUED | OUTPATIENT
Start: 2024-05-29 | End: 2024-05-29 | Stop reason: HOSPADM

## 2024-05-29 RX ORDER — DOCUSATE SODIUM 100 MG/1
100 CAPSULE, LIQUID FILLED ORAL 2 TIMES DAILY
Status: DISCONTINUED | OUTPATIENT
Start: 2024-05-29 | End: 2024-05-30 | Stop reason: HOSPADM

## 2024-05-29 RX ORDER — VANCOMYCIN HYDROCHLORIDE 500 MG/10ML
INJECTION, POWDER, LYOPHILIZED, FOR SOLUTION INTRAVENOUS
Status: COMPLETED | OUTPATIENT
Start: 2024-05-29 | End: 2024-05-29

## 2024-05-29 RX ORDER — SCOLOPAMINE TRANSDERMAL SYSTEM 1 MG/1
1 PATCH, EXTENDED RELEASE TRANSDERMAL
COMMUNITY

## 2024-05-29 RX ORDER — SCOLOPAMINE TRANSDERMAL SYSTEM 1 MG/1
1 PATCH, EXTENDED RELEASE TRANSDERMAL
Status: DISCONTINUED | OUTPATIENT
Start: 2024-05-29 | End: 2024-05-30 | Stop reason: HOSPADM

## 2024-05-29 RX ORDER — SODIUM CHLORIDE AND POTASSIUM CHLORIDE 150; 900 MG/100ML; MG/100ML
INJECTION, SOLUTION INTRAVENOUS CONTINUOUS
Status: DISCONTINUED | OUTPATIENT
Start: 2024-05-29 | End: 2024-05-30

## 2024-05-29 RX ORDER — DEXAMETHASONE SODIUM PHOSPHATE 4 MG/ML
INJECTION, SOLUTION INTRA-ARTICULAR; INTRALESIONAL; INTRAMUSCULAR; INTRAVENOUS; SOFT TISSUE PRN
Status: DISCONTINUED | OUTPATIENT
Start: 2024-05-29 | End: 2024-05-29 | Stop reason: SURG

## 2024-05-29 RX ORDER — OXYCODONE HYDROCHLORIDE 5 MG/1
2.5 TABLET ORAL
Status: DISCONTINUED | OUTPATIENT
Start: 2024-05-29 | End: 2024-05-30 | Stop reason: HOSPADM

## 2024-05-29 RX ORDER — ONDANSETRON 4 MG/1
4 TABLET, ORALLY DISINTEGRATING ORAL EVERY 6 HOURS PRN
Status: DISCONTINUED | OUTPATIENT
Start: 2024-05-29 | End: 2024-05-30 | Stop reason: HOSPADM

## 2024-05-29 RX ORDER — LISINOPRIL 2.5 MG/1
5 TABLET ORAL DAILY
Status: DISCONTINUED | OUTPATIENT
Start: 2024-05-30 | End: 2024-05-30 | Stop reason: HOSPADM

## 2024-05-29 RX ORDER — BUTALBITAL, ACETAMINOPHEN AND CAFFEINE 50; 325; 40 MG/1; MG/1; MG/1
1 TABLET ORAL EVERY 4 HOURS PRN
Status: ON HOLD | COMMUNITY
End: 2024-05-30

## 2024-05-29 RX ORDER — AMOXICILLIN 250 MG
1 CAPSULE ORAL NIGHTLY
Status: DISCONTINUED | OUTPATIENT
Start: 2024-05-29 | End: 2024-05-30 | Stop reason: HOSPADM

## 2024-05-29 RX ORDER — ENEMA 19; 7 G/133ML; G/133ML
1 ENEMA RECTAL
Status: DISCONTINUED | OUTPATIENT
Start: 2024-05-29 | End: 2024-05-30 | Stop reason: HOSPADM

## 2024-05-29 RX ORDER — ONDANSETRON 4 MG/1
4 TABLET, ORALLY DISINTEGRATING ORAL EVERY 4 HOURS PRN
Status: DISCONTINUED | OUTPATIENT
Start: 2024-05-29 | End: 2024-05-29

## 2024-05-29 RX ORDER — SODIUM CHLORIDE, SODIUM LACTATE, POTASSIUM CHLORIDE, CALCIUM CHLORIDE 600; 310; 30; 20 MG/100ML; MG/100ML; MG/100ML; MG/100ML
INJECTION, SOLUTION INTRAVENOUS CONTINUOUS
Status: ACTIVE | OUTPATIENT
Start: 2024-05-29 | End: 2024-05-29

## 2024-05-29 RX ORDER — BISACODYL 10 MG
10 SUPPOSITORY, RECTAL RECTAL
Status: DISCONTINUED | OUTPATIENT
Start: 2024-05-29 | End: 2024-05-30 | Stop reason: HOSPADM

## 2024-05-29 RX ORDER — EPHEDRINE SULFATE 50 MG/ML
INJECTION, SOLUTION INTRAVENOUS PRN
Status: DISCONTINUED | OUTPATIENT
Start: 2024-05-29 | End: 2024-05-29 | Stop reason: SURG

## 2024-05-29 RX ORDER — LIDOCAINE HYDROCHLORIDE 20 MG/ML
INJECTION, SOLUTION EPIDURAL; INFILTRATION; INTRACAUDAL; PERINEURAL PRN
Status: DISCONTINUED | OUTPATIENT
Start: 2024-05-29 | End: 2024-05-29 | Stop reason: SURG

## 2024-05-29 RX ORDER — BUPIVACAINE HYDROCHLORIDE AND EPINEPHRINE 5; 5 MG/ML; UG/ML
INJECTION, SOLUTION EPIDURAL; INTRACAUDAL; PERINEURAL
Status: DISCONTINUED | OUTPATIENT
Start: 2024-05-29 | End: 2024-05-29 | Stop reason: HOSPADM

## 2024-05-29 RX ORDER — HYDROMORPHONE HYDROCHLORIDE 1 MG/ML
0.1 INJECTION, SOLUTION INTRAMUSCULAR; INTRAVENOUS; SUBCUTANEOUS
Status: DISCONTINUED | OUTPATIENT
Start: 2024-05-29 | End: 2024-05-29 | Stop reason: HOSPADM

## 2024-05-29 RX ORDER — BACLOFEN 10 MG/1
5 TABLET ORAL 3 TIMES DAILY PRN
Status: DISCONTINUED | OUTPATIENT
Start: 2024-05-29 | End: 2024-05-30 | Stop reason: HOSPADM

## 2024-05-29 RX ORDER — DIPHENHYDRAMINE HYDROCHLORIDE 50 MG/ML
25 INJECTION INTRAMUSCULAR; INTRAVENOUS EVERY 6 HOURS PRN
Status: DISCONTINUED | OUTPATIENT
Start: 2024-05-29 | End: 2024-05-30 | Stop reason: HOSPADM

## 2024-05-29 RX ORDER — HYDRALAZINE HYDROCHLORIDE 20 MG/ML
5 INJECTION INTRAMUSCULAR; INTRAVENOUS
Status: DISCONTINUED | OUTPATIENT
Start: 2024-05-29 | End: 2024-05-29 | Stop reason: HOSPADM

## 2024-05-29 RX ORDER — SODIUM CHLORIDE, SODIUM LACTATE, POTASSIUM CHLORIDE, AND CALCIUM CHLORIDE .6; .31; .03; .02 G/100ML; G/100ML; G/100ML; G/100ML
IRRIGANT IRRIGATION
Status: DISCONTINUED | OUTPATIENT
Start: 2024-05-29 | End: 2024-05-29 | Stop reason: HOSPADM

## 2024-05-29 RX ORDER — MAGNESIUM HYDROXIDE 1200 MG/15ML
LIQUID ORAL
Status: COMPLETED | OUTPATIENT
Start: 2024-05-29 | End: 2024-05-29

## 2024-05-29 RX ORDER — SODIUM CHLORIDE, SODIUM LACTATE, POTASSIUM CHLORIDE, CALCIUM CHLORIDE 600; 310; 30; 20 MG/100ML; MG/100ML; MG/100ML; MG/100ML
INJECTION, SOLUTION INTRAVENOUS
Status: DISCONTINUED | OUTPATIENT
Start: 2024-05-29 | End: 2024-05-29 | Stop reason: SURG

## 2024-05-29 RX ORDER — LABETALOL HYDROCHLORIDE 5 MG/ML
5 INJECTION, SOLUTION INTRAVENOUS
Status: DISCONTINUED | OUTPATIENT
Start: 2024-05-29 | End: 2024-05-29 | Stop reason: HOSPADM

## 2024-05-29 RX ORDER — SODIUM CHLORIDE, SODIUM LACTATE, POTASSIUM CHLORIDE, CALCIUM CHLORIDE 600; 310; 30; 20 MG/100ML; MG/100ML; MG/100ML; MG/100ML
INJECTION, SOLUTION INTRAVENOUS CONTINUOUS
Status: DISCONTINUED | OUTPATIENT
Start: 2024-05-29 | End: 2024-05-29 | Stop reason: HOSPADM

## 2024-05-29 RX ORDER — OXYCODONE HCL 5 MG/5 ML
5 SOLUTION, ORAL ORAL
Status: DISCONTINUED | OUTPATIENT
Start: 2024-05-29 | End: 2024-05-29 | Stop reason: HOSPADM

## 2024-05-29 RX ORDER — ROCURONIUM BROMIDE 10 MG/ML
INJECTION, SOLUTION INTRAVENOUS PRN
Status: DISCONTINUED | OUTPATIENT
Start: 2024-05-29 | End: 2024-05-29 | Stop reason: SURG

## 2024-05-29 RX ORDER — CEFAZOLIN SODIUM 1 G/3ML
INJECTION, POWDER, FOR SOLUTION INTRAMUSCULAR; INTRAVENOUS
Status: DISCONTINUED | OUTPATIENT
Start: 2024-05-29 | End: 2024-05-29 | Stop reason: HOSPADM

## 2024-05-29 RX ORDER — METOPROLOL TARTRATE 1 MG/ML
1 INJECTION, SOLUTION INTRAVENOUS
Status: DISCONTINUED | OUTPATIENT
Start: 2024-05-29 | End: 2024-05-29 | Stop reason: HOSPADM

## 2024-05-29 RX ORDER — OXYCODONE HYDROCHLORIDE 5 MG/1
5 TABLET ORAL EVERY 6 HOURS PRN
COMMUNITY

## 2024-05-29 RX ORDER — ACETAMINOPHEN 500 MG
1000 TABLET ORAL EVERY 6 HOURS PRN
Status: DISCONTINUED | OUTPATIENT
Start: 2024-06-03 | End: 2024-05-30 | Stop reason: HOSPADM

## 2024-05-29 RX ORDER — POLYETHYLENE GLYCOL 3350 17 G/17G
1 POWDER, FOR SOLUTION ORAL 2 TIMES DAILY PRN
Status: DISCONTINUED | OUTPATIENT
Start: 2024-05-29 | End: 2024-05-30 | Stop reason: HOSPADM

## 2024-05-29 RX ORDER — HYDROMORPHONE HYDROCHLORIDE 1 MG/ML
0.4 INJECTION, SOLUTION INTRAMUSCULAR; INTRAVENOUS; SUBCUTANEOUS
Status: DISCONTINUED | OUTPATIENT
Start: 2024-05-29 | End: 2024-05-29 | Stop reason: HOSPADM

## 2024-05-29 RX ADMIN — ROCURONIUM BROMIDE 50 MG: 50 INJECTION, SOLUTION INTRAVENOUS at 10:56

## 2024-05-29 RX ADMIN — FENTANYL CITRATE 25 MCG: 50 INJECTION, SOLUTION INTRAMUSCULAR; INTRAVENOUS at 12:06

## 2024-05-29 RX ADMIN — ACETAMINOPHEN 1000 MG: 500 TABLET, FILM COATED ORAL at 13:18

## 2024-05-29 RX ADMIN — SENNOSIDES AND DOCUSATE SODIUM 1 TABLET: 50; 8.6 TABLET ORAL at 20:06

## 2024-05-29 RX ADMIN — DEXAMETHASONE SODIUM PHOSPHATE 8 MG: 4 INJECTION INTRA-ARTICULAR; INTRALESIONAL; INTRAMUSCULAR; INTRAVENOUS; SOFT TISSUE at 10:56

## 2024-05-29 RX ADMIN — POTASSIUM CHLORIDE AND SODIUM CHLORIDE: 900; 150 INJECTION, SOLUTION INTRAVENOUS at 16:43

## 2024-05-29 RX ADMIN — SODIUM CHLORIDE, POTASSIUM CHLORIDE, SODIUM LACTATE AND CALCIUM CHLORIDE: 600; 310; 30; 20 INJECTION, SOLUTION INTRAVENOUS at 10:53

## 2024-05-29 RX ADMIN — PROPOFOL 100 MG: 10 INJECTION, EMULSION INTRAVENOUS at 10:57

## 2024-05-29 RX ADMIN — SUGAMMADEX 200 MG: 100 INJECTION, SOLUTION INTRAVENOUS at 12:23

## 2024-05-29 RX ADMIN — EPHEDRINE SULFATE 10 MG: 50 INJECTION, SOLUTION INTRAVENOUS at 11:08

## 2024-05-29 RX ADMIN — FENTANYL CITRATE 75 MCG: 50 INJECTION, SOLUTION INTRAMUSCULAR; INTRAVENOUS at 12:13

## 2024-05-29 RX ADMIN — Medication 100 MCG: at 11:19

## 2024-05-29 RX ADMIN — FENTANYL CITRATE 100 MCG: 50 INJECTION, SOLUTION INTRAMUSCULAR; INTRAVENOUS at 10:56

## 2024-05-29 RX ADMIN — Medication 100 MCG: at 11:08

## 2024-05-29 RX ADMIN — BUTALBITAL, ACETAMINOPHEN AND CAFFEINE 1 TABLET: 325; 50; 40 TABLET ORAL at 21:41

## 2024-05-29 RX ADMIN — ALPRAZOLAM 0.25 MG: 0.25 TABLET ORAL at 21:41

## 2024-05-29 RX ADMIN — DOCUSATE SODIUM 100 MG: 100 CAPSULE, LIQUID FILLED ORAL at 17:01

## 2024-05-29 RX ADMIN — FENTANYL CITRATE 50 MCG: 50 INJECTION, SOLUTION INTRAMUSCULAR; INTRAVENOUS at 11:26

## 2024-05-29 RX ADMIN — BACLOFEN 5 MG: 10 TABLET ORAL at 17:01

## 2024-05-29 RX ADMIN — CEFAZOLIN 2 G: 2 INJECTION, POWDER, FOR SOLUTION INTRAMUSCULAR; INTRAVENOUS at 20:10

## 2024-05-29 RX ADMIN — PROPOFOL 100 MG: 10 INJECTION, EMULSION INTRAVENOUS at 10:56

## 2024-05-29 RX ADMIN — LIDOCAINE HYDROCHLORIDE 70 MG: 20 INJECTION, SOLUTION EPIDURAL; INFILTRATION; INTRACAUDAL at 10:56

## 2024-05-29 RX ADMIN — ACETAMINOPHEN 1000 MG: 500 TABLET, FILM COATED ORAL at 20:05

## 2024-05-29 RX ADMIN — CEFAZOLIN 2 G: 1 INJECTION, POWDER, FOR SOLUTION INTRAMUSCULAR; INTRAVENOUS at 10:56

## 2024-05-29 RX ADMIN — ONDANSETRON 8 MG: 2 INJECTION INTRAMUSCULAR; INTRAVENOUS at 11:59

## 2024-05-29 SDOH — ECONOMIC STABILITY: TRANSPORTATION INSECURITY
IN THE PAST 12 MONTHS, HAS LACK OF RELIABLE TRANSPORTATION KEPT YOU FROM MEDICAL APPOINTMENTS, MEETINGS, WORK OR FROM GETTING THINGS NEEDED FOR DAILY LIVING?: NO

## 2024-05-29 SDOH — ECONOMIC STABILITY: TRANSPORTATION INSECURITY
IN THE PAST 12 MONTHS, HAS THE LACK OF TRANSPORTATION KEPT YOU FROM MEDICAL APPOINTMENTS OR FROM GETTING MEDICATIONS?: NO

## 2024-05-29 ASSESSMENT — LIFESTYLE VARIABLES
TOTAL SCORE: 0
TOTAL SCORE: 0
EVER FELT BAD OR GUILTY ABOUT YOUR DRINKING: NO
ON A TYPICAL DAY WHEN YOU DRINK ALCOHOL HOW MANY DRINKS DO YOU HAVE: 0
AVERAGE NUMBER OF DAYS PER WEEK YOU HAVE A DRINK CONTAINING ALCOHOL: 0
HAVE YOU EVER FELT YOU SHOULD CUT DOWN ON YOUR DRINKING: NO
EVER HAD A DRINK FIRST THING IN THE MORNING TO STEADY YOUR NERVES TO GET RID OF A HANGOVER: NO
HOW MANY TIMES IN THE PAST YEAR HAVE YOU HAD 5 OR MORE DRINKS IN A DAY: 0
TOTAL SCORE: 0
ALCOHOL_USE: NO
HAVE PEOPLE ANNOYED YOU BY CRITICIZING YOUR DRINKING: NO
DOES PATIENT WANT TO STOP DRINKING: NO
CONSUMPTION TOTAL: NEGATIVE

## 2024-05-29 ASSESSMENT — PAIN DESCRIPTION - PAIN TYPE
TYPE: ACUTE PAIN;SURGICAL PAIN
TYPE: SURGICAL PAIN
TYPE: SURGICAL PAIN
TYPE: ACUTE PAIN;SURGICAL PAIN
TYPE: ACUTE PAIN;SURGICAL PAIN
TYPE: SURGICAL PAIN

## 2024-05-29 ASSESSMENT — SOCIAL DETERMINANTS OF HEALTH (SDOH)
WITHIN THE LAST YEAR, HAVE YOU BEEN HUMILIATED OR EMOTIONALLY ABUSED IN OTHER WAYS BY YOUR PARTNER OR EX-PARTNER?: NO
WITHIN THE LAST YEAR, HAVE YOU BEEN AFRAID OF YOUR PARTNER OR EX-PARTNER?: NO
IN THE PAST 12 MONTHS, HAS THE ELECTRIC, GAS, OIL, OR WATER COMPANY THREATENED TO SHUT OFF SERVICE IN YOUR HOME?: NO
WITHIN THE PAST 12 MONTHS, THE FOOD YOU BOUGHT JUST DIDN'T LAST AND YOU DIDN'T HAVE MONEY TO GET MORE: NEVER TRUE
WITHIN THE PAST 12 MONTHS, YOU WORRIED THAT YOUR FOOD WOULD RUN OUT BEFORE YOU GOT THE MONEY TO BUY MORE: NEVER TRUE
WITHIN THE LAST YEAR, HAVE YOU BEEN KICKED, HIT, SLAPPED, OR OTHERWISE PHYSICALLY HURT BY YOUR PARTNER OR EX-PARTNER?: NO
WITHIN THE LAST YEAR, HAVE TO BEEN RAPED OR FORCED TO HAVE ANY KIND OF SEXUAL ACTIVITY BY YOUR PARTNER OR EX-PARTNER?: NO

## 2024-05-29 ASSESSMENT — PATIENT HEALTH QUESTIONNAIRE - PHQ9
SUM OF ALL RESPONSES TO PHQ9 QUESTIONS 1 AND 2: 0
1. LITTLE INTEREST OR PLEASURE IN DOING THINGS: NOT AT ALL
2. FEELING DOWN, DEPRESSED, IRRITABLE, OR HOPELESS: NOT AT ALL

## 2024-05-29 ASSESSMENT — FIBROSIS 4 INDEX: FIB4 SCORE: 0.77

## 2024-05-29 NOTE — LETTER
May 21, 2024    Patient Name: Alec Cruz  Surgeon Name: Soumya Lester M.D.  Surgery Facility: [facility] ([address] [facility])  Surgery Date: 5/29/2024    The time of your surgery is not final and may change up to and until the day of your surgery. You will be contacted 1-2 business days prior to your surgery date with your check-in and surgery time.    BEFORE YOUR SURGERY - WITH THE FACILITY  Pre Registration and/or Lab Work/Tests must be done within 60 days of your surgery date. These will be done at [facility], with an appointment. This appointment should be completed before your pre op appointment in our office.    On ASAP - if you have not already heard from [facility] Pre Admission/Registration Department, please call them at [phone] option 2, then option 1, for an appointment.      DAY OF YOUR SURGERY  Nothing to eat or drink and refrain from smoking any substance after midnight the day of your surgery. Smoking may interfere with the anesthetic and frequently produces nausea during the recovery period.    Continue taking all lifesaving medications, including the morning of your surgery with small sip of water.    You will need a responsible adult to drive you to and from your surgery.    AFTER YOUR SURGERY  2 Week Post op Appointment:   Date: 06/14/24   Time: 12:30PM  With: Boris Piper PA-C   Location: [address] [place], [place] [address]    6 Week Post op Appointment:   Date: 07/12/24   Time: 12:30PM   With: Boris Piper PA-C   Location: [address] [place] [address]    MEDICATION INSTRUCTIONS  Do not take these medications prior to your procedure:            Anti-inflammatories: stop 7 days prior, restart when advised. For fusions avoid for 12 weeks after surgery            Naproxen (Naprosyn or Alleve)            Motrin            Ibuprofen            Nabumetone (Relafen)            Meloxicam (Mobic)            Celebrex            Salsalate            Diclofenac (Arthrotec, Voltaren, Flector)             Sulindac (Clinoril            Etodolac (Lodine)            Indomethacin (Indocin)            Ketoprofen            Ketorolac            Oxaprozin (Daypro)            Piroxicam (Feldene)            Blood thinners (stop after approval from the prescribing physician)            Aspirin (any dosage): Stop 14 days prior, restart 7 days after procedure            Any medications that contain aspirin in combination (ie: Excedrin migraine, Fiorinal, and Norgesic)            Warfarin (Coumadin): Stop 14 days prior, INR day of procedure, restart 2-3 weeks after procedure            Antiplatelet: Stop 14 days prior, restart 7 days after procedure            Ticlid (ticlopidine): Stop 14 days prior            Plavix (clopidogrel): Stop 7 days prior            Aggrenox or Dipyridamole: Stop 14 days prior            ReoPro (abciximab): Stop 14 days prior            Integrilin (eptifibatide): Stop 14 days prior            Aggrastat (tirofiban): Stop 14 days prior            Lovenox (Enoxaparin): Stop 24hrs before and restart 24hrs after procedure            Heparin: Stop 24hrs before             Dalteparin (Fragmin): Stop 24hrs before            Fondaparinux (Arixtra): Stop 24hrs before            Xeralto, Dabigatran (Pradaxa) Stop 5 days prior            Eliquis (apibaxan) Stop 7 days prior    Okay to take these medications as prescribed:            Muscle relaxers            Acetaminophen and pain medications that have it in addition to oxycodone and hydrocodone            Blood pressure, cholesterol and diabetes medications are ok      TIME OFF WORK  FMLA or Disability forms can be faxed directly to [phone] or you may drop them off at any [facility]. Our office charges a $35.00 fee. Forms will be completed within 5-10 business days after payment is received. For the status of your forms you may contact our disability office directly at [phone].    DENTAL PROCDURES/CLEANINGS Avoid 3 weeks before  surgery and for 3 months after surgery.    QUESTIONS ABOUT COSTS  Contact our Patient Financial Services department at [phone] for more information.    If you have any questions, please contact our office.    Aliya   Surgery Scheduler  [email or web address]  ? [phone]  Fax: [phone]  EXT [address]  [place] [address]  [phone]

## 2024-05-29 NOTE — OR NURSING
Report called to receiving RN Henrique. Pt taken via bed to T318. VSS. Drsg c/d/I. No distress. Wife udpated.   No

## 2024-05-29 NOTE — PROGRESS NOTES
Medication history reviewed with PT at bedside    [facility] rec is complete per PT reporting    Allergies reviewed.     PT was on Bactrim DS 10 day course started 05/17/2024. Last dose was 05/28/2024    Patient is not taking anticoagulants.    Preferred pharmacy for this visit - [facility] on [address] ([phone])

## 2024-05-29 NOTE — OP REPORT
1.  DATE OF SURGERY:5/8/2024    2. SURGEON:  Soumya Lester MD, PhD, ELLE, Neurosurgeon    3. ASSISTANT:  Boris Piper PA-C    An assistant was crucial to have during the case as the assistant helped with positioning, keeping the field clear of blood and retraction. This case could not be done easily without a qualified assistant. It was medically necessary      4. TYPE OF ANESTHESIA:  General anesthesia with endotracheal intubation    5. PREOPERATIVE DIAGNOSIS:  Postoperative  pseudomeningocele/CSF leak      1.  Status post L2-3 laminectomy 20/2/2024 with repair of delayed CSF leak 5/7/2024     2.  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        6. POSTOPERATIVE DIAGNOSIS:  Postoperative  pseudomeningocele/CSF leak      1.  Status post L2-3 laminectomy 20 2/2024 with repair of delayed CSF leak 5/7/2024     2.  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    7. HISTORY: See formal admission H and P    his is 79-year-old man  Underwent initially uncomplicated L2-3 laminectomy on 4/22/2024.  He developed a delayed CSF leak.  He had surgery for this on 5/7/2024.  He was good for a week.  He then had a recurrent pseudomeningocele.  He was contained with minimal fluid loss.  He was relatively asymptomatic in terms of headache.  The swelling was quite tense.  We consequently suggested we take him back, explore him and repair what looks to be another delayed CSF leak.  Risk benefits alternatives outlined in the office note.      8. PREOPERATIVE PHYSICAL EXAMINATION: See formal admission H and P    Awake, alert.  .              Speech fluent appropriate        In no apparent distress        vitals are stable.  Wound was swollen.   Full strength in his legs.  9.  TITLE OF THE OPERATION:  Reopening of lumbar wound, repair of pseudomeningocele and CSF leak  Microscopic microdissection.  Incision and drainage of posterior lumbar wound-evacuation of  pseudomeningocele    10. OPERATIVE FINDINGS: Large pseudomeningocele.  No signs of acute infection. Leaking at site of previous repair on left L2/3 mainly through the suture holes.        11. OPERATED SIDE/LEVEL: L23     12. IMPLANTS USED: Nil    13. COMPLICATIONS:  Nil.    14. ESTIMATED BLOOD LOSS:     10   mL        15. OPERATIVE DETAILS:  After fully informed consent, the patient was brought to the operating room.  General anesthesia was administered.  The patient was given intravenous antibiotic.  The patient was carefully turned prone on the OSI operating table on the Moise frame.  All pressure points were padded.      The posterior lumbar region was prepped and draped in a standard fashion.  The previous incision was reopened. Self-retraining retractors were placed. The dura was inspected. The pseudomeningocele was drained. The microscope was bought into the field of view.   I inspected the dura. No large leak. Mailny leaking at site of previous repair and suture holes on left.  The dural tear was repaired with multiple interrupted 6-0 cortex sutures and muscle plugs. I also used dural clips. At the end it appeared dry.  A valsalva confirmed no further dural leakage. A dural patch and Duragen glue was placed over the repair. The wound was closed with interrupted 0 Vicryl sutures for the fascia, 2-0 Vicryl for the subdermal tissues and vertical mattress interrupted  3-0 nylon sutures for the skin. All counts were correct and all instruments accounted for.      16. PROGNOSIS:  The surgery went well as it did after the second repair.  At the end of the case, the patient awoke moving his/her upper and lower extremities well. The patient will stay on flat bed rest for 24 hours. I would anticipate the patient will be discharged home in 36-48 hours.  When the patient goes home, he/she will be discharged home on narcotic analgesia, a muscle relaxant and oral antibiotic, usually Keflex.  The plan will be to follow up  in 2 weeks in the office for review and suture removal.  We will call the patient next week to ensure there are not any problems.  He/she can shower in 72 hours but is instructed to keep the wound dry.  The patient has also been instructed to abstain from smoking or any anti-inflammatories.  I explained to the family that if he leaks again we will look at placing a spinal drain for 5 days.  Hopefully of this will seal him.    Soumya Lester MD, PhD, ELLE

## 2024-05-29 NOTE — OR SURGEON
Immediate Post OP Note    5. PREOPERATIVE DIAGNOSIS:  Postoperative  pseudomeningocele/CSF leak       1.  Status post L2-3 laminectomy 20/2/2024 with repair of delayed CSF leak 5/7/2024     2.  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 POSTOPERATIVE DIAGNOSIS:  Postoperative  pseudomeningocele/CSF leak       1.  Status post L2-3 laminectomy 20 2/2024 with repair of delayed CSF leak 5/7/2024     2.  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    Anesthesiologist/Type of Anesthesia:  Anesthesiologist: Sp Patricia D.O./General    Surgical Staff:  Assistant: Boris Piper P.A.-C.  Circulator: Lukas D. Gansert, R.N.  Scrub Person: Aliya Andrade    Specimens removed if any:  * No specimens in log *  Assistants: Boris Piper PA-C  ,  Specimen: nil    Estimated Blood Loss: 10 cc    Findings: leaking left side L2/3 through suture holes. No head up until tomorrow morning.     Complications: nil      5/29/2024 12:30 PM Soumya Letser M.D.

## 2024-05-29 NOTE — PROGRESS NOTES
Virtual nurse admission profile complete. Bedside RN informed that spouse at bedside has patient's advanced healthcare directive paperwork.

## 2024-05-29 NOTE — ANESTHESIA PREPROCEDURE EVALUATION
Case: [id number] Date/Time: 05/29/24 1115    Procedure: Reopening of lumbar wound and repair of cerebrospinal fluid leak (Spine Lumbar)    Anesthesia type: General    Diagnosis: Postoperative CSF leak [G97.82, G96.00]    Pre-op diagnosis: Postoperative CSF leak    Location: [facility] OR 07 / SURGERY [facility]    Surgeons: Soumya Lester M.D.            Relevant Problems   CARDIAC   (positive) HTN (hypertension)      Other   (positive) Neurogenic claudication due to lumbar spinal stenosis   (positive) Postoperative CSF leak       Physical Exam    Airway   Mallampati: II  TM distance: >3 FB  Neck ROM: full       Cardiovascular - normal exam  Rhythm: regular  Rate: normal  (-) murmur     Dental - normal exam           Pulmonary - normal exam  Breath sounds clear to auscultation     Abdominal    Neurological - normal exam                   Anesthesia Plan    ASA 2       Plan - general       Airway plan will be ETT          Induction: intravenous    Postoperative Plan: Postoperative administration of opioids is intended.    Pertinent diagnostic labs and testing reviewed    Informed Consent:    Anesthetic plan and risks discussed with patient.    Use of blood products discussed with: patient whom consented to blood products.

## 2024-05-29 NOTE — ANESTHESIA TIME REPORT
Anesthesia Start and Stop Event Times       Date Time Event    5/29/2024 1037 Ready for Procedure     1053 Anesthesia Start     1231 Anesthesia Stop          Responsible Staff  05/29/24      Name Role Begin End    Sp Patricia D.O. Anesth 1053 1231          Overtime Reason:  no overtime (within assigned shift)    Comments:

## 2024-05-29 NOTE — PROGRESS NOTES
4 Eyes Skin Assessment Completed by BALA Duenas and BALA Napoles.    Head WDL  Ears WDL  Nose WDL  Mouth WDL  Neck WDL  Breast/Chest WDL  Shoulder Blades WDL  Spine Incision  (R) Arm/Elbow/Hand WDL  (L) Arm/Elbow/Hand WDL  Abdomen WDL  Groin WDL  Scrotum/Coccyx/Buttocks WDL  (R) Leg WDL  (L) Leg WDL  (R) Heel/Foot/Toe WDL  (L) Heel/Foot/Toe WDL          Devices In Places Blood Pressure Cuff, Pulse Ox, and Rodriguez      Interventions In Place Gray Ear Foams and Pillows    Possible Skin Injury No    Pictures Uploaded Into Epic N/A  Wound Consult Placed N/A  RN Wound Prevention Protocol Ordered No

## 2024-05-29 NOTE — OR NURSING
Pt arrived via bed from OR w/ RN and anesthesia. VSS. Report received. Orders reviewed and initiated. Mitali c/d/I.

## 2024-05-29 NOTE — ANESTHESIA PROCEDURE NOTES
Airway    Date/Time: 5/29/2024 10:58 AM    Performed by: Sp Patricia D.O.  Authorized by: Sp Patricia D.O.    Location:  OR  Urgency:  Elective  Indications for Airway Management:  Anesthesia      Spontaneous Ventilation: absent    Sedation Level:  Deep  Preoxygenated: Yes    Patient Position:  Sniffing  Mask Difficulty Assessment:  1 - vent by mask  Final Airway Type:  Endotracheal airway  Final Endotracheal Airway:  ETT  Cuffed: Yes    Technique Used for Successful ETT Placement:  Direct laryngoscopy    Insertion Site:  Oral  Blade Type:  Britni  Laryngoscope Blade/Videolaryngoscope Blade Size:  4  ETT Size (mm):  7.5  Measured from:  Teeth  ETT to Teeth (cm):  23  Placement Verified by: auscultation and capnometry    Cormack-Lehane Classification:  Grade I - full view of glottis  Number of Attempts at Approach:  1

## 2024-05-30 ENCOUNTER — PHARMACY VISIT (OUTPATIENT)
Dept: PHARMACY | Facility: MEDICAL CENTER | Age: 79
End: 2024-05-30
Payer: COMMERCIAL

## 2024-05-30 VITALS
HEIGHT: 70 IN | DIASTOLIC BLOOD PRESSURE: 70 MMHG | OXYGEN SATURATION: 93 % | TEMPERATURE: 98.1 F | BODY MASS INDEX: 24.55 KG/M2 | RESPIRATION RATE: 17 BRPM | WEIGHT: 171.52 LBS | HEART RATE: 82 BPM | SYSTOLIC BLOOD PRESSURE: 110 MMHG

## 2024-05-30 LAB
ANION GAP SERPL CALC-SCNC: 12 MMOL/L (ref 7–16)
APTT PPP: 27.2 SEC (ref 24.7–36)
BUN SERPL-MCNC: 9 MG/DL (ref 8–22)
CALCIUM SERPL-MCNC: 8.3 MG/DL (ref 8.5–10.5)
CHLORIDE SERPL-SCNC: 104 MMOL/L (ref 96–112)
CO2 SERPL-SCNC: 21 MMOL/L (ref 20–33)
CREAT SERPL-MCNC: 0.66 MG/DL (ref 0.5–1.4)
ERYTHROCYTE [DISTWIDTH] IN BLOOD BY AUTOMATED COUNT: 43.9 FL (ref 35.9–50)
GFR SERPLBLD CREATININE-BSD FMLA CKD-EPI: 96 ML/MIN/1.73 M 2
GLUCOSE SERPL-MCNC: 126 MG/DL (ref 65–99)
HCT VFR BLD AUTO: 37.6 % (ref 42–52)
HGB BLD-MCNC: 12.8 G/DL (ref 14–18)
INR PPP: 1.13 (ref 0.87–1.13)
MCH RBC QN AUTO: 30.5 PG (ref 27–33)
MCHC RBC AUTO-ENTMCNC: 34 G/DL (ref 32.3–36.5)
MCV RBC AUTO: 89.7 FL (ref 81.4–97.8)
PLATELET # BLD AUTO: 185 K/UL (ref 164–446)
PMV BLD AUTO: 9.3 FL (ref 9–12.9)
POTASSIUM SERPL-SCNC: 4.3 MMOL/L (ref 3.6–5.5)
PROTHROMBIN TIME: 14.6 SEC (ref 12–14.6)
RBC # BLD AUTO: 4.19 M/UL (ref 4.7–6.1)
SODIUM SERPL-SCNC: 137 MMOL/L (ref 135–145)
WBC # BLD AUTO: 6.6 K/UL (ref 4.8–10.8)

## 2024-05-30 PROCEDURE — 700111 HCHG RX REV CODE 636 W/ 250 OVERRIDE (IP): Performed by: PHYSICIAN ASSISTANT

## 2024-05-30 PROCEDURE — RXMED WILLOW AMBULATORY MEDICATION CHARGE: Performed by: PHYSICIAN ASSISTANT

## 2024-05-30 PROCEDURE — 700101 HCHG RX REV CODE 250: Performed by: PHYSICIAN ASSISTANT

## 2024-05-30 PROCEDURE — 700102 HCHG RX REV CODE 250 W/ 637 OVERRIDE(OP): Performed by: PHYSICIAN ASSISTANT

## 2024-05-30 PROCEDURE — 85730 THROMBOPLASTIN TIME PARTIAL: CPT

## 2024-05-30 PROCEDURE — 99024 POSTOP FOLLOW-UP VISIT: CPT | Performed by: PHYSICIAN ASSISTANT

## 2024-05-30 PROCEDURE — 85610 PROTHROMBIN TIME: CPT

## 2024-05-30 PROCEDURE — 85027 COMPLETE CBC AUTOMATED: CPT

## 2024-05-30 PROCEDURE — A9270 NON-COVERED ITEM OR SERVICE: HCPCS | Performed by: PHYSICIAN ASSISTANT

## 2024-05-30 PROCEDURE — 80048 BASIC METABOLIC PNL TOTAL CA: CPT

## 2024-05-30 PROCEDURE — 700105 HCHG RX REV CODE 258: Performed by: PHYSICIAN ASSISTANT

## 2024-05-30 RX ORDER — BUTALBITAL, ACETAMINOPHEN AND CAFFEINE 50; 325; 40 MG/1; MG/1; MG/1
1 TABLET ORAL EVERY 6 HOURS PRN
Qty: 28 TABLET | Refills: 0 | Status: SHIPPED | OUTPATIENT
Start: 2024-05-30 | End: 2024-06-06

## 2024-05-30 RX ORDER — CEPHALEXIN 500 MG/1
500 CAPSULE ORAL 4 TIMES DAILY
Qty: 20 CAPSULE | Refills: 0 | Status: ACTIVE | OUTPATIENT
Start: 2024-05-30 | End: 2024-06-04

## 2024-05-30 RX ADMIN — ACETAMINOPHEN 1000 MG: 500 TABLET, FILM COATED ORAL at 06:32

## 2024-05-30 RX ADMIN — CEFAZOLIN 2 G: 2 INJECTION, POWDER, FOR SOLUTION INTRAMUSCULAR; INTRAVENOUS at 03:53

## 2024-05-30 RX ADMIN — BUTALBITAL, ACETAMINOPHEN AND CAFFEINE 1 TABLET: 325; 50; 40 TABLET ORAL at 04:00

## 2024-05-30 RX ADMIN — POTASSIUM CHLORIDE AND SODIUM CHLORIDE: 900; 150 INJECTION, SOLUTION INTRAVENOUS at 03:51

## 2024-05-30 RX ADMIN — DOCUSATE SODIUM 100 MG: 100 CAPSULE, LIQUID FILLED ORAL at 06:32

## 2024-05-30 RX ADMIN — ALPRAZOLAM 0.25 MG: 0.25 TABLET ORAL at 04:00

## 2024-05-30 ASSESSMENT — COGNITIVE AND FUNCTIONAL STATUS - GENERAL
MOBILITY SCORE: 18
MOVING TO AND FROM BED TO CHAIR: A LITTLE
MOVING FROM LYING ON BACK TO SITTING ON SIDE OF FLAT BED: A LITTLE
STANDING UP FROM CHAIR USING ARMS: A LITTLE
DRESSING REGULAR LOWER BODY CLOTHING: A LITTLE
SUGGESTED CMS G CODE MODIFIER DAILY ACTIVITY: CJ
DAILY ACTIVITIY SCORE: 21
CLIMB 3 TO 5 STEPS WITH RAILING: A LITTLE
TURNING FROM BACK TO SIDE WHILE IN FLAT BAD: A LITTLE
HELP NEEDED FOR BATHING: A LITTLE
TOILETING: A LITTLE
SUGGESTED CMS G CODE MODIFIER MOBILITY: CK
WALKING IN HOSPITAL ROOM: A LITTLE

## 2024-05-30 ASSESSMENT — PAIN SCALES - GENERAL: PAIN_LEVEL: 4

## 2024-05-30 NOTE — PROGRESS NOTES
"Neurosurgery  POD# 1  Seen with Dr. Lester, wife at bedside  Flat in bed, gates in place  Tolerating oral medications  Denies nausea, vomiting  Mild headache this morning  Pain controlled on current medication regimen  No leg symptoms    Objective:  /65   Pulse 60   Temp 36.7 °C (98.1 °F) (Temporal)   Resp 16   Ht 1.778 m (5' 10\")   Wt 77.8 kg (171 lb 8.3 oz)   SpO2 95%     Intake/Output Summary (Last 24 hours) at 5/30/2024 0738  Last data filed at 5/30/2024 0351  Gross per 24 hour   Intake 1075 ml   Output 2150 ml   Net -1075 ml       Recent Labs     05/30/24  0049   WBC 6.6   RBC 4.19*   HEMOGLOBIN 12.8*   HEMATOCRIT 37.6*   MCV 89.7   MCH 30.5   MCHC 34.0   RDW 43.9   PLATELETCT 185   MPV 9.3     Recent Labs     05/30/24 0049   SODIUM 137   POTASSIUM 4.3   CHLORIDE 104   CO2 21   GLUCOSE 126*   BUN 9     Recent Labs     05/30/24 0049   APTT 27.2   INR 1.13     VSS  LS  Mild ooze on dressing  Strength:  Lower extremities are 5/5 grossly  Otherwise neurologically intact    Assessment:  Active Hospital Problems    Diagnosis     Postoperative CSF leak [G97.82, G96.00]      POD#1 S/p Reopening of lumbar wound and repair of spinal fluid leak  Chemoprophylaxis: No    Plan:  1. Elevate HOB slowly now, dangle legs when upright, p if tolerating upright, mobilize as tolerated  2. Advance diet as tolerated  3. D/c Gates if tolerating upright, d/c IV fluids  4. D/c home this afternoon, does not need PT/OT assessment, meds to beds for d/c meds, call with any issues   "

## 2024-05-30 NOTE — DISCHARGE INSTRUCTIONS
Follow up with our office in 2, 6, and 12 weeks.  Report to ER with any complications.  Call our office with any questions or concerns.  No anti-inflammatories for 2 weeks, no blood thinners for 2 weeks.   Clear to shower Saturday, pat incision dry, no special creams or ointments.   Avoid bending, lifting and twisting. Avoid straining activities.  Walk 4-6 times per day as tolerated to help prevent blood clots.

## 2024-05-30 NOTE — CARE PLAN
The patient is Stable - Low risk of patient condition declining or worsening    Shift Goals  Clinical Goals: strict bed rest, pain control, rest  Patient Goals: rest, pain control  Family Goals: pain control    Progress made toward(s) clinical / shift goals:    Problem: Pain - Standard  Goal: Alleviation of pain or a reduction in pain to the patient’s comfort goal  Outcome: Progressing   The patient's pain has been well controlled with oral pain medications and rest. The patient is using Fioricet for headache relief.   Problem: Knowledge Deficit - Standard  Goal: Patient and family/care givers will demonstrate understanding of plan of care, disease process/condition, diagnostic tests and medications  Outcome: Progressing   The patient is educated on his current plan of care, treatments, medication and has his questions and concerns answered. The patient and family are active participants in the patient care.   Problem: Skin Integrity  Goal: Skin integrity is maintained or improved  Outcome: Progressing   The patient's skin is intact, he is able to turn from side-to-side with the help of staff. No skin issues present at this time.   Problem: Fall Risk  Goal: Patient will remain free from falls  Outcome: Progressing   The patient is a low to moderate fall risk, precautions are in place  Problem: Neuro Status  Goal: Neuro status will remain stable or improve  Outcome: Progressing   Neuro is at baseline/ improved, patient does have small headache that is tolerable and is helped with Fioricet and tylenol.     Patient is not progressing towards the following goals:

## 2024-05-30 NOTE — PROGRESS NOTES
Alec Cruz has been provided discharge instructions, to include follow up care, home medications, and activity/diet reviewed. Understanding verbalized. IV removed. Catheter tip intact, bleeding controlled. Arm band removed. Medications given from pharmacy. Pt ride present. Pt out of DCL at this time with personal belongings.

## 2024-05-30 NOTE — ANESTHESIA POSTPROCEDURE EVALUATION
Patient: Alec Cruz    Procedure Summary       Date: 05/29/24 Room / Location: [facility] 07 / SURGERY [facility]    Anesthesia Start: 1053 Anesthesia Stop: 1231    Procedure: Reopening of lumbar wound and repair of cerebrospinal fluid leak (Spine Lumbar) Diagnosis:       Postoperative CSF leak      (Postoperative CSF leak)    Surgeons: Soumya Lester M.D. Responsible Provider: Sp Patricia D.O.    Anesthesia Type: general ASA Status: 2            Final Anesthesia Type: general  Last vitals  BP   Blood Pressure : 111/65    Temp   36.7 °C (98.1 °F)    Pulse   60   Resp   16    SpO2   95 %      Anesthesia Post Evaluation    Patient location during evaluation: PACU  Patient participation: complete - patient participated  Level of consciousness: awake and alert  Pain score: 4    Airway patency: patent  Anesthetic complications: no  Cardiovascular status: hemodynamically stable  Respiratory status: acceptable  Hydration status: euvolemic    PONV: none          No notable events documented.     Nurse Pain Score: 5 (NPRS)

## 2024-05-30 NOTE — DISCHARGE PLANNING
Case Management Discharge Planning    Admission Date: 5/29/2024  GMLOS: 2.7  ALOS: 1    6-Clicks ADL Score: 21  6-Clicks Mobility Score: 18    Anticipated Discharge Dispo: Discharge Disposition: Discharged to home/self care (01)    DME Needed: No    Action(s) Taken: LMSW completed chart review and assessment. No SDOH noted at this time. Pt was discussed during IDT rounds, pt is medically cleared and set to DC home today. Anticipating no further CM needs.    Escalations Completed: None    Medically Clear: Yes    Next Steps: Pt is set to DC home today.    Barriers to Discharge: None    Is the patient up for discharge tomorrow: No, today.    Care Transition Team Assessment    Information Source  Orientation Level: Oriented X4  Information Given By:  (Chart review)  Informant's Name: Yue  Who is responsible for making decisions for patient? : Patient    Readmission Evaluation  Is this a readmission?: Yes - unplanned readmission    Elopement Risk  Legal Hold: No  Ambulatory or Self Mobile in Wheelchair: No-Not an Elopement Risk  Elopement Risk: Not at Risk for Elopement    Interdisciplinary Discharge Planning  Lives with - Patient's Self Care Capacity: Spouse  Patient or legal guardian wants to designate a caregiver: No  Support Systems: Family Member(s), Spouse / Significant Other  Housing / Facility: [address]    Discharge Preparedness  What is your plan after discharge?: Home with help  What are your discharge supports?: Spouse    Vision / Hearing Impairment  Right Eye Vision: Impaired, Wears Glasses  Left Eye Vision: Impaired, Wears Glasses    Advance Directive  Advance Directive?: None    Domestic Abuse  Physical Abuse or Sexual Abuse: No  Verbal Abuse or Emotional Abuse: No  Possible Abuse/Neglect Reported to:: Not Applicable    Discharge Risks or Barriers  Patient risk factors: Readmission    Anticipated Discharge Information  Discharge Disposition: Discharged to home/self care (01)

## 2024-05-30 NOTE — DISCHARGE SUMMARY
Discharge Summary    CHIEF COMPLAINT ON ADMISSION  No chief complaint on file.      Reason for Admission  Acquired pseudomeningocele     Admission Date  5/29/2024    CODE STATUS  Full Code    HPI & HOSPITAL COURSE  This is 79-year-old man  Underwent initially uncomplicated L2-3 laminectomy on 4/22/2024.  He developed a delayed CSF leak.  He had surgery for this on 5/7/2024.  He was good for a week.  He then had a recurrent pseudomeningocele.  He was contained with minimal fluid loss.  He was relatively asymptomatic in terms of headache.  The swelling was quite tense.  We consequently suggested we take him back, explore him and repair what looks to be another delayed CSF leak.  Risk benefits alternatives outlined in the office note.        5/30/2024  On postoperative day #1 the patient is doing quite well.  There is mild incisional site discomfort which is well controlled with oral analgesics at this time.  The patient is eating and drinking without complication.  They will mobilize slowly this morning. He has a mild headache. They do not report any nausea, vomiting, fever.  They have remained hemodynamically stable.  Based upon the above information the patient will be discharged to home in a stable condition.     No notes on file    Therefore, he is discharged in fair and stable condition to home with close outpatient follow-up.    The patient recovered much more quickly than anticipated on admission.    Discharge Date  5/30/2024     FOLLOW UP ITEMS POST DISCHARGE  Follow up with our office in 2, 6, and 12 weeks.  Report to ER with any complications.  Call our office with any questions or concerns.  No anti-inflammatories for 2 weeks, no blood thinners for 2 weeks.   Clear to shower Saturday, pat incision dry, no special creams or ointments.   Avoid bending, lifting and twisting. Avoid straining activities.  Walk 4-6 times per day as tolerated to help prevent blood clots.     DISCHARGE DIAGNOSES  Principal  Problem:    Postoperative CSF leak (POA: Unknown)  Resolved Problems:    * No resolved hospital problems. *      FOLLOW UP  Future Appointments   Date Time Provider Department Center   6/11/2024  4:00 PM YUNIOR Mckeon [facility]   6/14/2024 12:30 PM YUNIOR Acevedo [facility]   6/25/2024 12:30 PM YUNIOR Mckeon [facility]   7/12/2024 12:30 PM YUNIOR Acevedo [facility]     No follow-up provider specified.    MEDICATIONS ON DISCHARGE     Medication List        START taking these medications        Instructions   cephALEXin 500 MG Caps  Commonly known as: Keflex   Take 1 Capsule by mouth 4 times a day for 5 days.  Dose: 500 mg            CHANGE how you take these medications        Instructions   butalbital/apap/caffeine -40 mg Tabs  What changed: when to take this  Commonly known as: Fioricet   Take 1 Tablet by mouth every 6 hours as needed for Headache for up to 7 days.  Dose: 1 Tablet            CONTINUE taking these medications        Instructions   acetaminophen 500 MG Tabs  Commonly known as: Tylenol   Take 500-1,000 mg by mouth as needed for Moderate Pain.  Dose: 500-1,000 mg     atorvastatin 10 MG Tabs  Commonly known as: Lipitor   Take 10 mg by mouth every Monday, Wednesday, and Friday.  Dose: 10 mg     baclofen 5 MG Tabs  Commonly known as: Lioresal   Take 5 mg by mouth 3 times a day as needed (spasm).  Dose: 5 mg     lisinopril 5 MG Tabs  Commonly known as: Prinivil   Take 1 Tab by mouth every day.  Dose: 5 mg     methylPREDNISolone 4 MG Tbpk  Commonly known as: Medrol Dosepak   Take 4 mg by mouth see administration instructions. Follow schedule on package instructions.  6 day course started 05/02/2024  Dose: 4 mg     ondansetron 4 MG Tbdp  Commonly known as: Zofran ODT   Take 4 mg by mouth every 6 hours as needed for Nausea/Vomiting.  Dose: 4 mg     oxyCODONE immediate-release 5 MG Tabs  Commonly known as: Roxicodone   Take 5 mg  by mouth every 6 hours as needed for Severe Pain.  Dose: 5 mg     scopolamine 1 mg/72hr Pt72  Commonly known as: Transderm-Scop   Place 1 Patch on the skin every 3 days as needed (nausea/vomiting).  Dose: 1 Patch            STOP taking these medications      sulfamethoxazole-trimethoprim 800-160 MG tablet  Commonly known as: Bactrim DS              Allergies  Allergies   Allergen Reactions    Hydrocodone Hives and Swelling     Throat swelling       DIET  Orders Placed This Encounter   Procedures    Diet Order Diet: Full Liquid     Standing Status:   Standing     Number of Occurrences:   1     Order Specific Question:   Diet:     Answer:   Full Liquid [11]       ACTIVITY  As tolerated.  Weight bearing as tolerated    CONSULTATIONS  None    PROCEDURES  TITLE OF THE OPERATION:  Reopening of lumbar wound, repair of pseudomeningocele and CSF leak  Microscopic microdissection.  Incision and drainage of posterior lumbar wound-evacuation of pseudomeningocele    LABORATORY  Lab Results   Component Value Date    SODIUM 137 05/30/2024    POTASSIUM 4.3 05/30/2024    CHLORIDE 104 05/30/2024    CO2 21 05/30/2024    GLUCOSE 126 (H) 05/30/2024    BUN 9 05/30/2024    CREATININE 0.66 05/30/2024        Lab Results   Component Value Date    WBC 6.6 05/30/2024    HEMOGLOBIN 12.8 (L) 05/30/2024    HEMATOCRIT 37.6 (L) 05/30/2024    PLATELETCT 185 05/30/2024        Total time of the discharge process exceeds 30 minutes.

## 2024-05-30 NOTE — PROGRESS NOTES
1115 Patient tolerating elevated HOB, patient ambulatory in room with no HA, gates cath removed per order.     1400 Patient + void in BR post gates removal.

## 2024-06-05 ENCOUNTER — HOSPITAL ENCOUNTER (INPATIENT)
Facility: MEDICAL CENTER | Age: 79
LOS: 7 days | DRG: 092 | End: 2024-06-12
Attending: EMERGENCY MEDICINE | Admitting: STUDENT IN AN ORGANIZED HEALTH CARE EDUCATION/TRAINING PROGRAM
Payer: COMMERCIAL

## 2024-06-05 ENCOUNTER — TELEPHONE (OUTPATIENT)
Dept: NEUROSURGERY | Facility: MEDICAL CENTER | Age: 79
End: 2024-06-05
Payer: COMMERCIAL

## 2024-06-05 ENCOUNTER — APPOINTMENT (OUTPATIENT)
Dept: RADIOLOGY | Facility: MEDICAL CENTER | Age: 79
DRG: 092 | End: 2024-06-05
Attending: NEUROLOGICAL SURGERY
Payer: COMMERCIAL

## 2024-06-05 DIAGNOSIS — G96.00 CSF LEAK: ICD-10-CM

## 2024-06-05 PROBLEM — G96.198 PSEUDOMENINGOCELE: Status: ACTIVE | Noted: 2024-06-05

## 2024-06-05 LAB
ANION GAP SERPL CALC-SCNC: 15 MMOL/L (ref 7–16)
APTT PPP: 26.4 SEC (ref 24.7–36)
BASOPHILS # BLD AUTO: 0.1 % (ref 0–1.8)
BASOPHILS # BLD: 0.01 K/UL (ref 0–0.12)
BUN SERPL-MCNC: 9 MG/DL (ref 8–22)
CALCIUM SERPL-MCNC: 9.5 MG/DL (ref 8.5–10.5)
CHLORIDE SERPL-SCNC: 102 MMOL/L (ref 96–112)
CO2 SERPL-SCNC: 22 MMOL/L (ref 20–33)
CREAT SERPL-MCNC: 0.66 MG/DL (ref 0.5–1.4)
EOSINOPHIL # BLD AUTO: 0.09 K/UL (ref 0–0.51)
EOSINOPHIL NFR BLD: 1.1 % (ref 0–6.9)
ERYTHROCYTE [DISTWIDTH] IN BLOOD BY AUTOMATED COUNT: 44 FL (ref 35.9–50)
GFR SERPLBLD CREATININE-BSD FMLA CKD-EPI: 96 ML/MIN/1.73 M 2
GLUCOSE SERPL-MCNC: 101 MG/DL (ref 65–99)
HCT VFR BLD AUTO: 47.7 % (ref 42–52)
HGB BLD-MCNC: 16.2 G/DL (ref 14–18)
IMM GRANULOCYTES # BLD AUTO: 0.04 K/UL (ref 0–0.11)
IMM GRANULOCYTES NFR BLD AUTO: 0.5 % (ref 0–0.9)
INR PPP: 0.99 (ref 0.87–1.13)
LYMPHOCYTES # BLD AUTO: 1.48 K/UL (ref 1–4.8)
LYMPHOCYTES NFR BLD: 18.4 % (ref 22–41)
MCH RBC QN AUTO: 30.2 PG (ref 27–33)
MCHC RBC AUTO-ENTMCNC: 34 G/DL (ref 32.3–36.5)
MCV RBC AUTO: 89 FL (ref 81.4–97.8)
MONOCYTES # BLD AUTO: 0.64 K/UL (ref 0–0.85)
MONOCYTES NFR BLD AUTO: 8 % (ref 0–13.4)
NEUTROPHILS # BLD AUTO: 5.79 K/UL (ref 1.82–7.42)
NEUTROPHILS NFR BLD: 71.9 % (ref 44–72)
NRBC # BLD AUTO: 0 K/UL
NRBC BLD-RTO: 0 /100 WBC (ref 0–0.2)
PLATELET # BLD AUTO: 249 K/UL (ref 164–446)
PMV BLD AUTO: 9 FL (ref 9–12.9)
POTASSIUM SERPL-SCNC: 4.4 MMOL/L (ref 3.6–5.5)
PROTHROMBIN TIME: 13.2 SEC (ref 12–14.6)
RBC # BLD AUTO: 5.36 M/UL (ref 4.7–6.1)
SODIUM SERPL-SCNC: 139 MMOL/L (ref 135–145)
WBC # BLD AUTO: 8.1 K/UL (ref 4.8–10.8)

## 2024-06-05 PROCEDURE — 99285 EMERGENCY DEPT VISIT HI MDM: CPT

## 2024-06-05 PROCEDURE — 770001 HCHG ROOM/CARE - MED/SURG/GYN PRIV*

## 2024-06-05 PROCEDURE — 99497 ADVNCD CARE PLAN 30 MIN: CPT | Performed by: STUDENT IN AN ORGANIZED HEALTH CARE EDUCATION/TRAINING PROGRAM

## 2024-06-05 PROCEDURE — 36415 COLL VENOUS BLD VENIPUNCTURE: CPT

## 2024-06-05 PROCEDURE — 700117 HCHG RX CONTRAST REV CODE 255: Performed by: NEUROLOGICAL SURGERY

## 2024-06-05 PROCEDURE — 99223 1ST HOSP IP/OBS HIGH 75: CPT | Mod: 25,AI | Performed by: STUDENT IN AN ORGANIZED HEALTH CARE EDUCATION/TRAINING PROGRAM

## 2024-06-05 PROCEDURE — 72158 MRI LUMBAR SPINE W/O & W/DYE: CPT

## 2024-06-05 PROCEDURE — 80048 BASIC METABOLIC PNL TOTAL CA: CPT

## 2024-06-05 PROCEDURE — A9579 GAD-BASE MR CONTRAST NOS,1ML: HCPCS | Performed by: NEUROLOGICAL SURGERY

## 2024-06-05 PROCEDURE — A9270 NON-COVERED ITEM OR SERVICE: HCPCS | Performed by: STUDENT IN AN ORGANIZED HEALTH CARE EDUCATION/TRAINING PROGRAM

## 2024-06-05 PROCEDURE — 85610 PROTHROMBIN TIME: CPT

## 2024-06-05 PROCEDURE — 700102 HCHG RX REV CODE 250 W/ 637 OVERRIDE(OP): Performed by: STUDENT IN AN ORGANIZED HEALTH CARE EDUCATION/TRAINING PROGRAM

## 2024-06-05 PROCEDURE — 85025 COMPLETE CBC W/AUTO DIFF WBC: CPT

## 2024-06-05 PROCEDURE — 85730 THROMBOPLASTIN TIME PARTIAL: CPT

## 2024-06-05 RX ORDER — LISINOPRIL 5 MG/1
5 TABLET ORAL DAILY
Status: DISCONTINUED | OUTPATIENT
Start: 2024-06-06 | End: 2024-06-12 | Stop reason: HOSPADM

## 2024-06-05 RX ORDER — OXYCODONE HYDROCHLORIDE 5 MG/1
5 TABLET ORAL EVERY 6 HOURS PRN
Status: DISCONTINUED | OUTPATIENT
Start: 2024-06-05 | End: 2024-06-12 | Stop reason: HOSPADM

## 2024-06-05 RX ORDER — ACETAMINOPHEN 500 MG
1000 TABLET ORAL PRN
Status: DISCONTINUED | OUTPATIENT
Start: 2024-06-05 | End: 2024-06-12 | Stop reason: HOSPADM

## 2024-06-05 RX ORDER — ATORVASTATIN CALCIUM 10 MG/1
10 TABLET, FILM COATED ORAL
Status: DISCONTINUED | OUTPATIENT
Start: 2024-06-07 | End: 2024-06-12 | Stop reason: HOSPADM

## 2024-06-05 RX ORDER — CEPHALEXIN 500 MG/1
500 CAPSULE ORAL 4 TIMES DAILY
COMMUNITY
Start: 2024-05-31

## 2024-06-05 RX ORDER — DIAZEPAM 2 MG/1
2 TABLET ORAL EVERY 6 HOURS PRN
Status: DISCONTINUED | OUTPATIENT
Start: 2024-06-05 | End: 2024-06-10

## 2024-06-05 RX ORDER — BUTALBITAL, ACETAMINOPHEN AND CAFFEINE 50; 325; 40 MG/1; MG/1; MG/1
1 TABLET ORAL EVERY 6 HOURS PRN
Status: DISCONTINUED | OUTPATIENT
Start: 2024-06-05 | End: 2024-06-12 | Stop reason: HOSPADM

## 2024-06-05 RX ADMIN — GADOTERIDOL 15 ML: 279.3 INJECTION, SOLUTION INTRAVENOUS at 21:59

## 2024-06-05 RX ADMIN — ACETAMINOPHEN 1000 MG: 500 TABLET, FILM COATED ORAL at 22:22

## 2024-06-05 ASSESSMENT — FIBROSIS 4 INDEX: FIB4 SCORE: 1.02

## 2024-06-05 ASSESSMENT — SOCIAL DETERMINANTS OF HEALTH (SDOH)
WITHIN THE LAST YEAR, HAVE TO BEEN RAPED OR FORCED TO HAVE ANY KIND OF SEXUAL ACTIVITY BY YOUR PARTNER OR EX-PARTNER?: NO
WITHIN THE LAST YEAR, HAVE YOU BEEN AFRAID OF YOUR PARTNER OR EX-PARTNER?: NO
IN THE PAST 12 MONTHS, HAS THE ELECTRIC, GAS, OIL, OR WATER COMPANY THREATENED TO SHUT OFF SERVICE IN YOUR HOME?: NO
WITHIN THE LAST YEAR, HAVE YOU BEEN KICKED, HIT, SLAPPED, OR OTHERWISE PHYSICALLY HURT BY YOUR PARTNER OR EX-PARTNER?: NO
WITHIN THE LAST YEAR, HAVE YOU BEEN HUMILIATED OR EMOTIONALLY ABUSED IN OTHER WAYS BY YOUR PARTNER OR EX-PARTNER?: NO
WITHIN THE PAST 12 MONTHS, THE FOOD YOU BOUGHT JUST DIDN'T LAST AND YOU DIDN'T HAVE MONEY TO GET MORE: NEVER TRUE
WITHIN THE PAST 12 MONTHS, YOU WORRIED THAT YOUR FOOD WOULD RUN OUT BEFORE YOU GOT THE MONEY TO BUY MORE: NEVER TRUE

## 2024-06-05 ASSESSMENT — PATIENT HEALTH QUESTIONNAIRE - PHQ9
1. LITTLE INTEREST OR PLEASURE IN DOING THINGS: NOT AT ALL
2. FEELING DOWN, DEPRESSED, IRRITABLE, OR HOPELESS: NOT AT ALL
SUM OF ALL RESPONSES TO PHQ9 QUESTIONS 1 AND 2: 0

## 2024-06-05 ASSESSMENT — LIFESTYLE VARIABLES
ON A TYPICAL DAY WHEN YOU DRINK ALCOHOL HOW MANY DRINKS DO YOU HAVE: 1
HOW MANY TIMES IN THE PAST YEAR HAVE YOU HAD 5 OR MORE DRINKS IN A DAY: 0
HAVE PEOPLE ANNOYED YOU BY CRITICIZING YOUR DRINKING: NO
EVER HAD A DRINK FIRST THING IN THE MORNING TO STEADY YOUR NERVES TO GET RID OF A HANGOVER: NO
TOTAL SCORE: 0
HAVE YOU EVER FELT YOU SHOULD CUT DOWN ON YOUR DRINKING: NO
AVERAGE NUMBER OF DAYS PER WEEK YOU HAVE A DRINK CONTAINING ALCOHOL: 3
ALCOHOL_USE: YES
CONSUMPTION TOTAL: NEGATIVE
EVER FELT BAD OR GUILTY ABOUT YOUR DRINKING: NO
TOTAL SCORE: 0
TOTAL SCORE: 0

## 2024-06-05 ASSESSMENT — ENCOUNTER SYMPTOMS
RESPIRATORY NEGATIVE: 1
EYES NEGATIVE: 1
GASTROINTESTINAL NEGATIVE: 1
WEAKNESS: 1
PSYCHIATRIC NEGATIVE: 1
MUSCULOSKELETAL NEGATIVE: 1
CARDIOVASCULAR NEGATIVE: 1

## 2024-06-05 NOTE — H&P
Hospital Medicine History & Physical Note    Date of Service  6/5/2024    Primary Care Physician  Maddison Stock M.D.    Consultants  Neurosurgery    Code Status  Full Code    Chief Complaint  Chief Complaint   Patient presents with    Sent by MD     Sent by Dr Lester for spinal drain placement.         History of Presenting Illness  Alec Cruz is a 79 y.o. male who presented 6/5/2024 with swelling at his surgical site in his lower back.    Patient has history of severe spinal stenosis and L2-L3 with mild stenosis in the foramina at L3-L4 and L4-L5 and a small L4-L5 spondylolisthesis and an incidental L1 hemangioma.  He is s/p L2-L3 laminectomy in April 22, 2024 which was complicated by a delayed CSF leak with a symptomatic pseudomeningocele to which patient had to go back to the OR for repair twice.    For the last few days, patient noted that at the site where he had his laminectomy started to become swollen.  Patient denies low back pain but endorses discomfort and mild weakness of his lower extremities bilaterally.  He denies bowel/bladder incontinence/numbness of groin area.  He went to the clinic today and the physician there recommended him to come into ER for eventual drain placement.    I discussed the plan of care with patient.    Review of Systems  Review of Systems   Constitutional:  Positive for malaise/fatigue.   HENT: Negative.     Eyes: Negative.    Respiratory: Negative.     Cardiovascular: Negative.    Gastrointestinal: Negative.    Genitourinary: Negative.    Musculoskeletal: Negative.    Skin: Negative.    Neurological:  Positive for weakness.   Endo/Heme/Allergies: Negative.    Psychiatric/Behavioral: Negative.         Past Medical History   has a past medical history of Arthritis, Cancer (HCC), High cholesterol, Hypertension, Prostate disorder, and Recurrent cold sores.    Surgical History   has a past surgical history that includes other (01/01/1964); other (07/01/1999); shoulder  decompression arthroscopic (05/27/2008); clavicle distal excision (05/27/2008); rotator cuff repair (05/27/2008); biceps tendon repair (05/27/2008); lumbar laminectomy diskectomy (N/A, 05/08/2024); appendectomy (2021); other (2021); other; other; and repair, dural defect with csf leak (5/29/2024).     Family History  family history includes Cancer in his father; Diabetes in his sister.   Family history reviewed with patient. There is no family history that is pertinent to the chief complaint.     Social History   reports that he has never smoked. He has never used smokeless tobacco. He reports current alcohol use of about 3.0 - 6.6 oz of alcohol per week. He reports that he does not use drugs.    Allergies  Allergies   Allergen Reactions    Hydrocodone Hives and Swelling     Throat swelling       Medications  Prior to Admission Medications   Prescriptions Last Dose Informant Patient Reported? Taking?   acetaminophen (TYLENOL) 500 MG Tab  Patient Yes No   Sig: Take 500-1,000 mg by mouth as needed for Moderate Pain.   atorvastatin (LIPITOR) 10 MG Tab  Patient Yes No   Sig: Take 10 mg by mouth every Monday, Wednesday, and Friday.   baclofen (LIORESAL) 5 MG Tab  Patient Yes No   Sig: Take 5 mg by mouth 3 times a day as needed (spasm).   butalbital/apap/caffeine (FIORICET) -40 mg Tab   No No   Sig: Take 1 Tablet by mouth every 6 hours as needed for Headache for up to 7 days.   lisinopril (PRINIVIL) 5 MG TABS  Patient No No   Sig: Take 1 Tab by mouth every day.   methylPREDNISolone (MEDROL DOSEPAK) 4 MG Tablet Therapy Pack  Patient Yes No   Sig: Take 4 mg by mouth see administration instructions. Follow schedule on package instructions.  6 day course started 05/02/2024   ondansetron (ZOFRAN ODT) 4 MG TABLET DISPERSIBLE  Patient Yes No   Sig: Take 4 mg by mouth every 6 hours as needed for Nausea/Vomiting.   oxyCODONE immediate-release (ROXICODONE) 5 MG Tab  Patient Yes No   Sig: Take 5 mg by mouth every 6 hours as  "needed for Severe Pain.   scopolamine (TRANSDERM-SCOP) 1 mg/72hr PATCH 72 HR  Patient Yes No   Sig: Place 1 Patch on the skin every 3 days as needed (nausea/vomiting).      Facility-Administered Medications: None       Physical Exam  Temp:  [37.2 °C (98.9 °F)] 37.2 °C (98.9 °F)  Pulse:  [89] 89  Resp:  [16] 16  BP: (146)/(86) 146/86  SpO2:  [98 %] 98 %  Blood Pressure : (!) 146/86   Temperature: 37.2 °C (98.9 °F)   Pulse: 89   Respiration: 16   Pulse Oximetry: 98 %       Physical Exam  Constitutional:       Appearance: Normal appearance. He is normal weight.   HENT:      Head: Normocephalic.      Nose: Nose normal.      Mouth/Throat:      Mouth: Mucous membranes are moist.   Eyes:      Pupils: Pupils are equal, round, and reactive to light.   Cardiovascular:      Rate and Rhythm: Normal rate and regular rhythm.   Pulmonary:      Effort: Pulmonary effort is normal.      Breath sounds: Normal breath sounds.   Abdominal:      General: Abdomen is flat. Bowel sounds are normal.      Palpations: Abdomen is soft.   Musculoskeletal:         General: Normal range of motion.      Cervical back: Neck supple.      Comments: Incision noted on lower back, sutures in place, surrounded by area of granulation tissue.  Able to be pushed in.  No drainage noted.  No pain upon palpation.   Skin:     General: Skin is warm.   Neurological:      General: No focal deficit present.      Mental Status: He is alert and oriented to person, place, and time. Mental status is at baseline.   Psychiatric:         Mood and Affect: Mood normal.         Behavior: Behavior normal.         Thought Content: Thought content normal.         Judgment: Judgment normal.         Laboratory:          No results for input(s): \"ALTSGPT\", \"ASTSGOT\", \"ALKPHOSPHAT\", \"TBILIRUBIN\", \"DBILIRUBIN\", \"GAMMAGT\", \"AMYLASE\", \"LIPASE\", \"ALB\", \"PREALBUMIN\", \"GLUCOSE\" in the last 72 hours.      No results for input(s): \"NTPROBNP\" in the last 72 hours.      No results for " "input(s): \"TROPONINT\" in the last 72 hours.    Imaging:  No orders to display       no X-Ray or EKG requiring interpretation    Assessment/Plan:  Justification for Admission Status  I anticipate this patient will require at least two midnights for appropriate medical management, necessitating inpatient admission because pt has a CSF leak    Patient will need a Med/Surg bed on EMERGENCY service .  The need is secondary to CSF leak.    * CSF leak  Assessment & Plan  Patient with history of L2-L3 laminectomy complicated by repeated incidence of CSF leak x2 noted to have swelling at the site with mild weakness in lower extremities bilaterally  Neurosurgery on board, recommend n.p.o. midnight for drain placement in AM  MRI lumbar spine with contrast  Oxycodone prn, valium prn    ACP (advance care planning)- (present on admission)  Assessment & Plan  16 minutes by discussing goals of care with patient.  He was explained that he will be n.p.o. at midnight for drain placement in the a.m.  He was asked about CODE STATUS, to which he states he would like to be full code and to have everything done, including chest compressions and intubation if needed.    Hyperlipemia- (present on admission)  Assessment & Plan  Lipitor    Hypertension  Assessment & Plan  Lisinopril        VTE prophylaxis: pharmacologic prophylaxis contraindicated due to OR in AM   "

## 2024-06-05 NOTE — ED TRIAGE NOTES
Pt ambulated to triage with   Chief Complaint   Patient presents with    Sent by MD     Sent by Dr Lester for spinal drain placement.       Pt Informed regarding triage process and verbalized understanding to inform triage tech or RN for any changes in condition. Placed in lobby.

## 2024-06-05 NOTE — TELEPHONE ENCOUNTER
Patient sent emial with pictures of his wound. He has swelling consistent with CSF leak. Case discussed with Dr. Lester. He has been instructed to come to the ER for a spinal fluid drain today with IR.

## 2024-06-05 NOTE — ED PROVIDER NOTES
ED Provider Note    CHIEF COMPLAINT  Chief Complaint   Patient presents with    Sent by MD     Sent by Dr Lester for spinal drain placement.         EXTERNAL RECORDS REVIEWED  Outpatient Notes outpatient rock/spine notes reviewed.    HPI/ROS  LIMITATION TO HISTORY   Select: : None  OUTSIDE HISTORIAN(S):  Family wife at bedside    Alec Cruz is a 79 y.o. male who presents to the emergency department for continuation of care.  Patient followed by neurosurgeon Dr. Lester.  Had  herniated disks 2 years ago while on [place] [place].  Since then he more recently had surgery and has had persistent recurrent CSF leak.  Today was evaluated outpatient clinic and ultimately sent here for emergent MRI and likely IR placed spinal drain.  Patient will be n.p.o. after midnight.  Drain to likely be placed tomorrow.  Dr. Lester will arrange all of the above.    PAST MEDICAL HISTORY   has a past medical history of Arthritis, Cancer (HCC), High cholesterol, Hypertension, Prostate disorder, and Recurrent cold sores.    SURGICAL HISTORY   has a past surgical history that includes other (01/01/1964); other (07/01/1999); shoulder decompression arthroscopic (05/27/2008); clavicle distal excision (05/27/2008); rotator cuff repair (05/27/2008); biceps tendon repair (05/27/2008); lumbar laminectomy diskectomy (N/A, 05/08/2024); appendectomy (2021); other (2021); other; other; and repair, dural defect with csf leak (5/29/2024).    FAMILY HISTORY  Family History   Problem Relation Age of Onset    Cancer Father     Diabetes Sister        SOCIAL HISTORY  Social History     Tobacco Use    Smoking status: Never    Smokeless tobacco: Never   Vaping Use    Vaping status: Former   Substance and Sexual Activity    Alcohol use: Yes     Alcohol/week: 3.0 - 6.6 oz     Types: 4 Cans of beer, 1 - 7 Standard drinks or equivalent per week    Drug use: No    Sexual activity: Yes     Partners: Female       CURRENT MEDICATIONS  Home Medications       Reviewed  "by Deven Brown (Pharmacy Tech) on 06/05/24 at 1748  Med List Status: Complete     Medication Last Dose Status   acetaminophen (TYLENOL) 500 MG Tab 6/5/2024 Active   atorvastatin (LIPITOR) 10 MG Tab 6/5/2024 Active   baclofen (LIORESAL) 5 MG Tab COUPLE WEEKS AGO Active   butalbital/apap/caffeine (FIORICET) -40 mg Tab COUPLE WEEKS AGO Active   cephALEXin (KEFLEX) 500 MG Cap 6/5/2024 Active   lisinopril (PRINIVIL) 5 MG TABS 6/5/2024 Active                  Audit from Redirected Encounters    **Home medications have not yet been reviewed for this encounter**         ALLERGIES  Allergies   Allergen Reactions    Hydrocodone Hives and Swelling     Throat swelling       PHYSICAL EXAM  VITAL SIGNS: /84   Pulse 67   Temp 37.2 °C (99 °F) (Temporal)   Resp 17   Ht 1.778 m (5' 10\")   Wt 78.4 kg (172 lb 13.5 oz)   SpO2 94%   BMI 24.80 kg/m²      Pulse ox interpretation: I interpret this pulse ox as normal.  Constitutional: Alert in no apparent distress.  HENT: No signs of trauma, Bilateral external ears normal, Nose normal.   Eyes: Pupils are equal and reactive, Conjunctiva normal, Non-icteric.   Neck: Normal range of motion, No tenderness, Supple, No stridor.   Lymphatic: No lymphadenopathy noted.   Cardiovascular: Regular rate and rhythm, no murmurs.   Thorax & Lungs: Normal breath sounds, No respiratory distress, No wheezing, No chest tenderness.   Abdomen: Bowel sounds normal, Soft, No tenderness  Skin: Warm, Dry, No erythema, No rash.   Musculoskeletal: Good range of motion in all major joints. No tenderness to palpation or major deformities noted.   Neurologic: Alert , Normal motor function, Normal sensory function, No focal deficits noted.   Psychiatric: Affect normal, Judgment normal, Mood normal.         EKG/LABS  Results for orders placed or performed during the hospital encounter of 06/05/24   CBC WITH DIFFERENTIAL   Result Value Ref Range    WBC 8.1 4.8 - 10.8 K/uL    RBC 5.36 4.70 - 6.10 " M/uL    Hemoglobin 16.2 14.0 - 18.0 g/dL    Hematocrit 47.7 42.0 - 52.0 %    MCV 89.0 81.4 - 97.8 fL    MCH 30.2 27.0 - 33.0 pg    MCHC 34.0 32.3 - 36.5 g/dL    RDW 44.0 35.9 - 50.0 fL    Platelet Count 249 164 - 446 K/uL    MPV 9.0 9.0 - 12.9 fL    Neutrophils-Polys 71.90 44.00 - 72.00 %    Lymphocytes 18.40 (L) 22.00 - 41.00 %    Monocytes 8.00 0.00 - 13.40 %    Eosinophils 1.10 0.00 - 6.90 %    Basophils 0.10 0.00 - 1.80 %    Immature Granulocytes 0.50 0.00 - 0.90 %    Nucleated RBC 0.00 0.00 - 0.20 /100 WBC    Neutrophils (Absolute) 5.79 1.82 - 7.42 K/uL    Lymphs (Absolute) 1.48 1.00 - 4.80 K/uL    Monos (Absolute) 0.64 0.00 - 0.85 K/uL    Eos (Absolute) 0.09 0.00 - 0.51 K/uL    Baso (Absolute) 0.01 0.00 - 0.12 K/uL    Immature Granulocytes (abs) 0.04 0.00 - 0.11 K/uL    NRBC (Absolute) 0.00 K/uL   BASIC METABOLIC PANEL   Result Value Ref Range    Sodium 139 135 - 145 mmol/L    Potassium 4.4 3.6 - 5.5 mmol/L    Chloride 102 96 - 112 mmol/L    Co2 22 20 - 33 mmol/L    Glucose 101 (H) 65 - 99 mg/dL    Bun 9 8 - 22 mg/dL    Creatinine 0.66 0.50 - 1.40 mg/dL    Calcium 9.5 8.5 - 10.5 mg/dL    Anion Gap 15.0 7.0 - 16.0   PROTHROMBIN TIME   Result Value Ref Range    PT 13.2 12.0 - 14.6 sec    INR 0.99 0.87 - 1.13   APTT   Result Value Ref Range    APTT 26.4 24.7 - 36.0 sec   ESTIMATED GFR   Result Value Ref Range    GFR (CKD-EPI) 96 >60 mL/min/1.73 m 2         COURSE & MEDICAL DECISION MAKING    ASSESSMENT, COURSE AND PLAN  Care Narrative: 79-year-old male presenting with above presentation.  Will complete neurosurgical request for hospitalization for further neurosurgical care    DISPOSITION AND DISCUSSIONS  I have discussed management of the patient with the following physicians and NAOMY's: Dr. Lester and hospitalist Dr. White    Discussion of management with other [facility] or appropriate source(s): None   Patient presenting for ongoing inpatient care as per neurosurgery.  I have orchestrated this care along with the  neurosurgeon here in the ER.  Hospitalist has evaluated the patient in the ER.  MRI pending.  Potential IR drain placement tomorrow.  Patient will be n.p.o. after midnight.  Valium will be ordered as needed for patient's anxiety    FINAL DIAGNOSIS  1. CSF leak           Electronically signed by: Kiko Swanson M.D., 6/5/2024 4:49 PM

## 2024-06-06 LAB
CFT BLD TEG: 3.8 MIN (ref 4.6–9.1)
CFT P HPASE BLD TEG: 4.3 MIN (ref 4.3–8.3)
CLOT ANGLE BLD TEG: 73.3 DEGREES (ref 63–78)
CLOT LYSIS 30M P MA LENFR BLD TEG: 1.1 % (ref 0–2.6)
CT.EXTRINSIC BLD ROTEM: 1.2 MIN (ref 0.8–2.1)
MCF BLD TEG: 61.3 MM (ref 52–69)
MCF.PLATELET INHIB BLD ROTEM: 21.5 MM (ref 15–32)
PA AA BLD-ACNC: 8.4 % (ref 0–11)
PA ADP BLD-ACNC: 17.3 % (ref 0–17)
TEG ALGORITHM TGALG: ABNORMAL

## 2024-06-06 PROCEDURE — 700105 HCHG RX REV CODE 258: Performed by: PHYSICIAN ASSISTANT

## 2024-06-06 PROCEDURE — 770001 HCHG ROOM/CARE - MED/SURG/GYN PRIV*

## 2024-06-06 PROCEDURE — 85576 BLOOD PLATELET AGGREGATION: CPT

## 2024-06-06 PROCEDURE — A9270 NON-COVERED ITEM OR SERVICE: HCPCS | Performed by: STUDENT IN AN ORGANIZED HEALTH CARE EDUCATION/TRAINING PROGRAM

## 2024-06-06 PROCEDURE — 700102 HCHG RX REV CODE 250 W/ 637 OVERRIDE(OP): Performed by: STUDENT IN AN ORGANIZED HEALTH CARE EDUCATION/TRAINING PROGRAM

## 2024-06-06 PROCEDURE — 700102 HCHG RX REV CODE 250 W/ 637 OVERRIDE(OP)

## 2024-06-06 PROCEDURE — 85384 FIBRINOGEN ACTIVITY: CPT | Mod: 91

## 2024-06-06 PROCEDURE — 700111 HCHG RX REV CODE 636 W/ 250 OVERRIDE (IP): Performed by: PHYSICIAN ASSISTANT

## 2024-06-06 PROCEDURE — A9270 NON-COVERED ITEM OR SERVICE: HCPCS | Performed by: NEUROLOGICAL SURGERY

## 2024-06-06 PROCEDURE — 700102 HCHG RX REV CODE 250 W/ 637 OVERRIDE(OP): Performed by: NEUROLOGICAL SURGERY

## 2024-06-06 PROCEDURE — 85347 COAGULATION TIME ACTIVATED: CPT

## 2024-06-06 PROCEDURE — 99232 SBSQ HOSP IP/OBS MODERATE 35: CPT | Performed by: STUDENT IN AN ORGANIZED HEALTH CARE EDUCATION/TRAINING PROGRAM

## 2024-06-06 PROCEDURE — 36415 COLL VENOUS BLD VENIPUNCTURE: CPT

## 2024-06-06 PROCEDURE — A9270 NON-COVERED ITEM OR SERVICE: HCPCS

## 2024-06-06 RX ORDER — SODIUM CHLORIDE 9 MG/ML
INJECTION, SOLUTION INTRAVENOUS CONTINUOUS
Status: DISCONTINUED | OUTPATIENT
Start: 2024-06-06 | End: 2024-06-07

## 2024-06-06 RX ADMIN — BUTALBITAL, ACETAMINOPHEN AND CAFFEINE 1 TABLET: 325; 50; 40 TABLET ORAL at 12:56

## 2024-06-06 RX ADMIN — CEFAZOLIN 2 G: 2 INJECTION, POWDER, FOR SOLUTION INTRAMUSCULAR; INTRAVENOUS at 21:51

## 2024-06-06 RX ADMIN — SODIUM CHLORIDE: 9 INJECTION, SOLUTION INTRAVENOUS at 12:36

## 2024-06-06 RX ADMIN — LISINOPRIL 5 MG: 10 TABLET ORAL at 05:20

## 2024-06-06 RX ADMIN — CEFAZOLIN 2 G: 2 INJECTION, POWDER, FOR SOLUTION INTRAMUSCULAR; INTRAVENOUS at 14:33

## 2024-06-06 RX ADMIN — DIAZEPAM 2 MG: 2 TABLET ORAL at 21:49

## 2024-06-06 RX ADMIN — ACETAMINOPHEN 1000 MG: 500 TABLET, FILM COATED ORAL at 09:08

## 2024-06-06 RX ADMIN — ACETAMINOPHEN 1000 MG: 500 TABLET, FILM COATED ORAL at 17:21

## 2024-06-06 ASSESSMENT — COGNITIVE AND FUNCTIONAL STATUS - GENERAL
SUGGESTED CMS G CODE MODIFIER MOBILITY: CH
SUGGESTED CMS G CODE MODIFIER DAILY ACTIVITY: CH
DAILY ACTIVITIY SCORE: 24
MOBILITY SCORE: 24

## 2024-06-06 ASSESSMENT — PAIN DESCRIPTION - PAIN TYPE
TYPE: ACUTE PAIN

## 2024-06-06 ASSESSMENT — ENCOUNTER SYMPTOMS
HEADACHES: 0
FEVER: 0
COUGH: 0

## 2024-06-06 NOTE — CONSULTS
DATE OF CONSULTATION: 6/5/2024       CONSULTING PHYSICIAN: Soumya Lester M.D.     REASON FOR CONSULTATION: Postoperative pseudomeningocele.     HISTORY OF PRESENT ILLNESS:     This is a very nice 79-year-old man.  He underwent an L2 laminectomy that was uncomplicated for 4/22/2024.  About a week afterwards he had positional headaches he underwent reopening his wound and repair of pseudomeningocele.  He did well from this.  Week after this he had a recurrent pseudomeningocele.  A week ago I took him to the operating room and I repaired him again.  Both times there was leakage on the left lateral recess.  Both times out of the sun and with muscle plugs and dural sealant and we given him 24 hours of bedrest.  He was doing well until today when the wound became swollen.  Symptomatically he is well.  There is no leg pain.  There is a little bit of heaviness in the legs.  There is no headache nausea or vomiting.  He had no drainage from the wound.  He has basically had a laminectomy with a delayed postoperative CSF leak and 2 repairs of the CSF leak and now has a pseudomeningocele.            PAST MEDICAL HISTORY:  has a past medical history of Arthritis, Cancer (HCC), High cholesterol, Hypertension, Prostate disorder, and Recurrent cold sores.     PAST SURGICAL HISTORY:  has a past surgical history that includes other (01/01/1964); other (07/01/1999); shoulder decompression arthroscopic (05/27/2008); clavicle distal excision (05/27/2008); rotator cuff repair (05/27/2008); biceps tendon repair (05/27/2008); lumbar laminectomy diskectomy (N/A, 05/08/2024); appendectomy (2021); other (2021); other; other; and repair, dural defect with csf leak (5/29/2024).     ALLERGIES:   Allergies   Allergen Reactions    Hydrocodone Hives and Swelling     Throat swelling        CURRENT MEDICATIONS:   Home Medications       Reviewed by Coco Carroll R.N. (Registered Nurse) on 06/05/24 at 1443  Med List Status: Partial  "    Medication Last Dose Status   acetaminophen (TYLENOL) 500 MG Tab  Active   atorvastatin (LIPITOR) 10 MG Tab  Active   baclofen (LIORESAL) 5 MG Tab  Active   butalbital/apap/caffeine (FIORICET) -40 mg Tab  Active   lisinopril (PRINIVIL) 5 MG TABS  Active   methylPREDNISolone (MEDROL DOSEPAK) 4 MG Tablet Therapy Pack  Active   ondansetron (ZOFRAN ODT) 4 MG TABLET DISPERSIBLE  Active   oxyCODONE immediate-release (ROXICODONE) 5 MG Tab  Active   scopolamine (TRANSDERM-SCOP) 1 mg/72hr PATCH 72 HR  Active                    FAMILY HISTORY:   Family History   Problem Relation Age of Onset    Cancer Father     Diabetes Sister         SOCIAL HISTORY:   Social History     Tobacco Use    Smoking status: Never    Smokeless tobacco: Never   Vaping Use    Vaping status: Former   Substance and Sexual Activity    Alcohol use: Yes     Alcohol/week: 3.0 - 6.6 oz     Types: 4 Cans of beer, 1 - 7 Standard drinks or equivalent per week    Drug use: No    Sexual activity: Yes     Partners: Female       REVIEW OF SYSTEMS: Comprehensive review of systems is negative with the   exception of the aforementioned HPI, PMH, and PSH elements in accordance with CMS guidelines.     PHYSICAL EXAMINATION:     VITAL SIGNS: BP (!) 146/86   Pulse 89   Temp 37.2 °C (98.9 °F) (Temporal)   Resp 16   Ht 1.778 m (5' 10\")   Wt 78.4 kg (172 lb 13.5 oz)   SpO2 98%     GENERAL:       He was awake and conversant.  Afebrile.  Neurologically intact.  There is a dressing on his back.  This was dry.  The wound was quite swollen and tense.  There is no CSF leaking from the wound.      LABORATORY VALUES:                  IMAGING:   MR-LUMBAR SPINE-WITH & W/O    (Results Pending)   IR-NEURO INTERVENTIONAL CONSULT-IP    (Results Pending)       IMPRESSION AND PLAN:     Active Hospital Problems    Diagnosis     CSF leak [G96.00]     ACP (advance care planning) [Z71.89]     Hyperlipemia [E78.5]      High HDL      Hypertension [I10]        1.  Status post " L2-3 laminectomy 4/22/2024 which was uncomplicated until he developed a delayed postoperative CSF leak.    2.  Status post x 2 reexploration and repair of CSF leaks    I spoke to the patient and his wife.  I recommended implantation of a spinal drain.  This would stay in for 5 days.  This will be placed by interventional radiology.  I spoken to the neuroradiologist who requested an MRI scan preoperatively.  She will stay on bedrest.  Tomorrow the spinal drain to be placed.  He will have bedrest for 5 days with follow-up privileges we will clamp at when he goes to the toilet.  He will have teds and sequentials.  He will get Valium or Xanax for anxiety.  Hopefully this will take care of things.  Risk benefits alternatives were discussed.  I specifically discussed the risks of infection, overdrainage and under drainage.  They understand.  I think a repeat exploration here is probably a waste of time.    Plan    1.  He is currently being admitted under medicine service    2.  MRI scan lumbar spine tonight    3.  Routine labs    4.  Teds and sequentials    5.  Bedrest until a privileges tonight    6.  N.p.o. 12 midnight    7.  Spinal drain will be placed by Dr. Donaldson  tomorrow    8.  Will keep him on Ancef for 5 days once the drain has been placed I would anticipate that we remove the drain lunchtime Tuesday.    All the above was discussed in detail with the patient and his wife.  Will see how things progress.    ____________________________________   STEFFEN FONTANA MD, PhD, [facility]      DD: 6/5/2024   DT: 5:13 PM      The history was taken was taken from the notes, the ER physician, the patient if possible and any family. Transcription software was used. I have perused the dictation and corrected to the best of my ability  but am not responsible for any errors or omissions as a result of using speech-text software.

## 2024-06-06 NOTE — PROGRESS NOTES
Report received. Assumed care. Pt in bed awake. A/O x4. VSS. Responds appropriately. C/O headache pain, medicated per MAR, no SOB. Assessment complete. Discussed POC, , pt verbalizes understanding.  Call light and belongings within reach.  Bed in the lowest position. Treaded socks in place. Hourly rounding in progress. Will continue to monitor .

## 2024-06-06 NOTE — PROGRESS NOTES
Neurosurgery Progress Note    Subjective:  Resting comfortably  Denies h/a  No drainage from the wound  MRI overnight: Non-compressive CSF collection  Scheduled for spinal drain with IR this am    Exam:  Wound: Dry but swollen  LE motor 5/5 throughout bilaterally  VSS  Labs stable    BP  Min: 129/73  Max: 146/86  Pulse  Av  Min: 62  Max: 89  Resp  Av.5  Min: 16  Max: 17  Temp  Av.1 °C (98.7 °F)  Min: 36.8 °C (98.2 °F)  Max: 37.2 °C (99 °F)  SpO2  Av.3 %  Min: 94 %  Max: 98 %    No data recorded    Recent Labs     24  1726   WBC 8.1   RBC 5.36   HEMOGLOBIN 16.2   HEMATOCRIT 47.7   MCV 89.0   MCH 30.2   MCHC 34.0   RDW 44.0   PLATELETCT 249   MPV 9.0     Recent Labs     24  1726   SODIUM 139   POTASSIUM 4.4   CHLORIDE 102   CO2 22   GLUCOSE 101*   BUN 9   CREATININE 0.66   CALCIUM 9.5     Recent Labs     24  1726   APTT 26.4   INR 0.99     Recent Labs     24  0049   REACTMIN 3.8*   CLOTKINET 1.2   CLOTANGL 73.3   MAXCLOTS 61.3   IQF95RNP 1.1   PRCINADP 17.3*   PRCINAA 8.4       Intake/Output                         24 07 - 24 0659 24 07 - 24 0659     [id number] [id number] Total [id number] Total                 Intake    Total Intake -- -- -- -- -- --       Output    Urine  --  -- --  --  -- --    Number of Times Voided -- 1 x 1 x -- -- --    Total Output -- -- -- -- -- --       Net I/O     -- -- -- -- -- --            No intake or output data in the 24 hours ending 24 0750          oxyCODONE immediate-release  5 mg Q6HRS PRN    lisinopril  5 mg DAILY    [START ON 2024] atorvastatin  10 mg MO, WE + FR    acetaminophen  1,000 mg PRN    diazePAM  2 mg Q6HRS PRN    butalbital/apap/caffeine  1 Tablet Q6HRS PRN       Assessment and Plan:  Hospital day #2    Spinal drain to be placed this am by IR    Plan:  Spinal drain with IR this am  Titrate drain height to 5-15cc per hour (drain tubing and bag at bedside)  1 gram Ancef TID once  spinal drain in place  Bed rest and flat once drain is placed  Clamp drain for toilet priviledges   Plan spinal drain for 5 days  Ernie lund and sequentials

## 2024-06-06 NOTE — PROGRESS NOTES
4 Eyes Skin Assessment Completed by BALA Loaiza and BALA Marks.    Head WDL  Ears WDL  Nose WDL  Mouth WDL  Neck WDL  Breast/Chest WDL  Shoulder Blades WDL  Spine Surgical incision  (R) Arm/Elbow/Hand WDL  (L) Arm/Elbow/Hand WDL  Abdomen WDL  Groin WDL  Scrotum/Coccyx/Buttocks WDL  (R) Leg WDL  (L) Leg WDL  (R) Heel/Foot/Toe WDL  (L) Heel/Foot/Toe WDL          Devices In Places Blood Pressure Cuff and Pulse Ox      Interventions In Place Pillows    Possible Skin Injury No    Pictures Uploaded Into Epic N/A  Wound Consult Placed N/A  RN Wound Prevention Protocol Ordered Yes

## 2024-06-06 NOTE — THERAPY
Physical Therapy Contact Note    Patient Name: Alec Cruz  Age:  79 y.o., Sex:  male  Medical Record #: [id number]  Today's Date: 6/6/2024    PT consult received and chart reviewed. Pt pending spinal drain placement with IR and then will be on bed rest per neurosurgery note. Will follow as appropriate.

## 2024-06-06 NOTE — CARE PLAN
The patient is Stable - Low risk of patient condition declining or worsening    Shift Goals  Clinical Goals: safety  Patient Goals: rest, comfort  Family Goals: Patient's support    Progress made toward(s) clinical / shift goals:    Problem: Knowledge Deficit - Standard  Goal: Patient and family/care givers will demonstrate understanding of plan of care, disease process/condition, diagnostic tests and medications  Outcome: Progressing    Educate the patient regarding his plan of care.       Patient is not progressing towards the following goals:

## 2024-06-06 NOTE — ASSESSMENT & PLAN NOTE
S/p L2/L3 Laminectomy 4/22/2024 with JACKI. Complicated by CSF leak x2.   MRI lumbar spine: CSF intense fluid collection posterior paraspinal soft tissue.  Neurosurgery following  IR CSF drain placed June 7  Plan to complete 5 days of bedrest tomorrow at which point may trial with drain out  Preoperative back pain is essentially resolved as are his radicular complaints.  Titrate drain level to 5 to 15 cc/h  Okay to clamp drain for toileting    Discussed with neurosurgery today

## 2024-06-06 NOTE — PROGRESS NOTES
Hospital Medicine Daily Progress Note    Date of Service  6/6/2024    Chief Complaint  Alec Cruz is a 79 y.o. 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  Admitted 6/5 for swelling at surgical site and CSF leak.    MRI lumbar spine:  There are postsurgical changes as evidenced by L2-3 laminectomy. CSF intense fluid collection is noted in the posterior paraspinal soft tissue adjacent to the laminectomy with extension into the posterior lateral epidural space.    Neurosurgery was consulted from the emergency room.  Patient is n.p.o. for drain placement today.    Interval Problem Update  6/6: Vital stable overnight.  MRI lumbar spine completed.  WBC stable at 8.1.  Hb stable at 16.  Creatinine 0.66.    I have discussed this patient's plan of care and discharge plan at IDT rounds today with Case Management, Nursing, Nursing leadership, and other members of the IDT team.    Consultants/Specialty  neurosurgery    Code Status  Full Code    Disposition  The patient is not medically cleared for discharge to home or a post-acute facility.      I have placed the appropriate orders for post-discharge needs.    Review of Systems  Review of Systems   Constitutional:  Negative for fever and malaise/fatigue.   Respiratory:  Negative for cough.    Cardiovascular:  Negative for chest pain and leg swelling.   Neurological:  Negative for headaches.        Physical Exam  Temp:  [36.8 °C (98.2 °F)-37.2 °C (99 °F)] 37.1 °C (98.8 °F)  Pulse:  [62-89] 66  Resp:  [16-17] 17  BP: (129-146)/(73-88) 139/87  SpO2:  [94 %-98 %] 97 %    Physical Exam  Vitals and nursing note reviewed.   Constitutional:       General: He is not in acute distress.     Appearance: Normal appearance. He is not ill-appearing.   HENT:       Head: Normocephalic.      Mouth/Throat:      Mouth: Mucous membranes are moist.      Pharynx: Oropharynx is clear.   Cardiovascular:      Rate and Rhythm: Normal rate and regular rhythm.      Heart sounds: No murmur heard.  Pulmonary:      Effort: Pulmonary effort is normal. No respiratory distress.      Breath sounds: Normal breath sounds. No wheezing.   Abdominal:      General: Abdomen is flat. Bowel sounds are normal. There is no distension.      Palpations: Abdomen is soft.      Tenderness: There is no abdominal tenderness.   Musculoskeletal:         General: No swelling. Normal range of motion.      Cervical back: Normal range of motion. No tenderness.   Skin:     General: Skin is warm and dry.   Neurological:      Mental Status: He is alert and oriented to person, place, and time.   Psychiatric:         Mood and Affect: Mood normal.         Behavior: Behavior normal.         Fluids  No intake or output data in the 24 hours ending 06/06/24 1314    Laboratory  Recent Labs     06/05/24  1726   WBC 8.1   RBC 5.36   HEMOGLOBIN 16.2   HEMATOCRIT 47.7   MCV 89.0   MCH 30.2   MCHC 34.0   RDW 44.0   PLATELETCT 249   MPV 9.0     Recent Labs     06/05/24  1726   SODIUM 139   POTASSIUM 4.4   CHLORIDE 102   CO2 22   GLUCOSE 101*   BUN 9   CREATININE 0.66   CALCIUM 9.5     Recent Labs     06/05/24  1726   APTT 26.4   INR 0.99               Imaging  MR-LUMBAR SPINE-WITH & W/O   Final Result      1.  There are postsurgical changes as evidenced by L2-3 laminectomy. CSF intense fluid collection is noted in the posterior paraspinal soft tissue adjacent to the laminectomy with extension into the posterior lateral epidural space. There is moderate    central canal stenosis at this level. Post gadolinium sequences demonstrates peripheral contrast enhancement. This finding likely represent postsurgical pseudomeningocele. The possibility of secondary infection cannot be excluded based on this finding.   2.  Degenerative changes as  described above.      IR-UNLISTED FLUORO INTERVENTIONAL PROC    (Results Pending)        Assessment/Plan  * CSF leak  Assessment & Plan  S/p L2/L3 Laminectomy 4/22/2024 with JACKI. Complicated by CSF leak x2.   MRI lumbar spine: CSF intense fluid collection posterior paraspinal soft tissue.  -Neurosurgery is following closely  - N.p.o. for drain placement today  - Continue to follow neurosurgery recommendations  -Oxycodone as needed pain  - Valium as needed for anxiety    ACP (advance care planning)- (present on admission)  Assessment & Plan  16 minutes by discussing goals of care with patient.  He was explained that he will be n.p.o. at midnight for drain placement in the a.m.  He was asked about CODE STATUS, to which he states he would like to be full code and to have everything done, including chest compressions and intubation if needed.    Hyperlipemia- (present on admission)  Assessment & Plan  Continue home atorvastatin 10mg daily     Hypertension  Assessment & Plan  SBP ranging 139-152  - Continue home lisinopril         VTE prophylaxis:   SCDs/TEDs   pharmacologic prophylaxis contraindicated due to surgery      I have performed a physical exam and reviewed and updated ROS and Plan today (6/6/2024). In review of yesterday's note (6/5/2024), there are no changes except as documented above.

## 2024-06-06 NOTE — ED NOTES
Med Rec complete per patient with spouse at bedside   Allergies reviewed  Antibiotics in the past 30 days:yes  Anticoagulant in past 14 days:no  Ph[facility] utilizes[facility]    Pt states today would have been his last day of completing Cephalexin however pt states he only took morning dose and has 3 pills remaing

## 2024-06-06 NOTE — HOSPITAL COURSE
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  Admitted 6/5 for swelling at surgical site and CSF leak.    MRI lumbar spine:  There are postsurgical changes as evidenced by L2-3 laminectomy. CSF intense fluid collection is noted in the posterior paraspinal soft tissue adjacent to the laminectomy with extension into the posterior lateral epidural space.    Neurosurgery was consulted from the emergency room.  Patient is n.p.o. for drain placement today.

## 2024-06-06 NOTE — ED NOTES
Pt transported off unit with transport tech. Pt awake and breathing with even, unlabored breaths on RA. All belongings with Pt.

## 2024-06-06 NOTE — THERAPY
Occupational Therapy Contact Note    Patient Name: Alec Cruz  Age:  79 y.o., Sex:  male  Medical Record #: [id number]  Today's Date: 6/6/2024 06/06/24 0803   Interdisciplinary Plan of Care Collaboration   Collaboration Comments OT orders received. Per notes, plan for spinal drain today. Will hold OT eval until post procedure and re-attempt as appropriate/able.

## 2024-06-07 ENCOUNTER — APPOINTMENT (OUTPATIENT)
Dept: RADIOLOGY | Facility: MEDICAL CENTER | Age: 79
DRG: 092 | End: 2024-06-07
Attending: NEUROLOGICAL SURGERY
Payer: COMMERCIAL

## 2024-06-07 PROBLEM — F41.9 ANXIETY: Status: ACTIVE | Noted: 2024-06-07

## 2024-06-07 LAB
ANION GAP SERPL CALC-SCNC: 10 MMOL/L (ref 7–16)
BUN SERPL-MCNC: 16 MG/DL (ref 8–22)
CALCIUM SERPL-MCNC: 8.4 MG/DL (ref 8.5–10.5)
CHLORIDE SERPL-SCNC: 108 MMOL/L (ref 96–112)
CO2 SERPL-SCNC: 23 MMOL/L (ref 20–33)
CREAT SERPL-MCNC: 0.7 MG/DL (ref 0.5–1.4)
ERYTHROCYTE [DISTWIDTH] IN BLOOD BY AUTOMATED COUNT: 44.5 FL (ref 35.9–50)
GFR SERPLBLD CREATININE-BSD FMLA CKD-EPI: 95 ML/MIN/1.73 M 2
GLUCOSE SERPL-MCNC: 99 MG/DL (ref 65–99)
HCT VFR BLD AUTO: 39.2 % (ref 42–52)
HGB BLD-MCNC: 13.3 G/DL (ref 14–18)
MAGNESIUM SERPL-MCNC: 2 MG/DL (ref 1.5–2.5)
MCH RBC QN AUTO: 30.6 PG (ref 27–33)
MCHC RBC AUTO-ENTMCNC: 33.9 G/DL (ref 32.3–36.5)
MCV RBC AUTO: 90.3 FL (ref 81.4–97.8)
PLATELET # BLD AUTO: 230 K/UL (ref 164–446)
PMV BLD AUTO: 9.1 FL (ref 9–12.9)
POTASSIUM SERPL-SCNC: 4.2 MMOL/L (ref 3.6–5.5)
RBC # BLD AUTO: 4.34 M/UL (ref 4.7–6.1)
SODIUM SERPL-SCNC: 141 MMOL/L (ref 135–145)
WBC # BLD AUTO: 6.4 K/UL (ref 4.8–10.8)

## 2024-06-07 PROCEDURE — 700117 HCHG RX CONTRAST REV CODE 255: Performed by: RADIOLOGY

## 2024-06-07 PROCEDURE — 009U30Z DRAINAGE OF SPINAL CANAL WITH DRAINAGE DEVICE, PERCUTANEOUS APPROACH: ICD-10-PCS | Performed by: RADIOLOGY

## 2024-06-07 PROCEDURE — A9270 NON-COVERED ITEM OR SERVICE: HCPCS

## 2024-06-07 PROCEDURE — 62329 THER SPI PNXR CSF FLUOR/CT: CPT

## 2024-06-07 PROCEDURE — 700102 HCHG RX REV CODE 250 W/ 637 OVERRIDE(OP): Performed by: NEUROLOGICAL SURGERY

## 2024-06-07 PROCEDURE — 62327 NJX INTERLAMINAR LMBR/SAC: CPT

## 2024-06-07 PROCEDURE — 99232 SBSQ HOSP IP/OBS MODERATE 35: CPT | Performed by: STUDENT IN AN ORGANIZED HEALTH CARE EDUCATION/TRAINING PROGRAM

## 2024-06-07 PROCEDURE — 306637 HCHG MISC ORTHO ITEM RC 0274

## 2024-06-07 PROCEDURE — 700111 HCHG RX REV CODE 636 W/ 250 OVERRIDE (IP): Performed by: PHYSICIAN ASSISTANT

## 2024-06-07 PROCEDURE — 700111 HCHG RX REV CODE 636 W/ 250 OVERRIDE (IP): Performed by: RADIOLOGY

## 2024-06-07 PROCEDURE — 700102 HCHG RX REV CODE 250 W/ 637 OVERRIDE(OP)

## 2024-06-07 PROCEDURE — A9270 NON-COVERED ITEM OR SERVICE: HCPCS | Performed by: NEUROLOGICAL SURGERY

## 2024-06-07 PROCEDURE — 85027 COMPLETE CBC AUTOMATED: CPT

## 2024-06-07 PROCEDURE — 700105 HCHG RX REV CODE 258: Performed by: PHYSICIAN ASSISTANT

## 2024-06-07 PROCEDURE — 99152 MOD SED SAME PHYS/QHP 5/>YRS: CPT

## 2024-06-07 PROCEDURE — 700111 HCHG RX REV CODE 636 W/ 250 OVERRIDE (IP)

## 2024-06-07 PROCEDURE — 83735 ASSAY OF MAGNESIUM: CPT

## 2024-06-07 PROCEDURE — 80048 BASIC METABOLIC PNL TOTAL CA: CPT

## 2024-06-07 PROCEDURE — 770000 HCHG ROOM/CARE - INTERMEDIATE ICU *

## 2024-06-07 PROCEDURE — A9270 NON-COVERED ITEM OR SERVICE: HCPCS | Performed by: STUDENT IN AN ORGANIZED HEALTH CARE EDUCATION/TRAINING PROGRAM

## 2024-06-07 PROCEDURE — 700102 HCHG RX REV CODE 250 W/ 637 OVERRIDE(OP): Performed by: STUDENT IN AN ORGANIZED HEALTH CARE EDUCATION/TRAINING PROGRAM

## 2024-06-07 RX ORDER — MIDAZOLAM HYDROCHLORIDE 1 MG/ML
.5-2 INJECTION INTRAMUSCULAR; INTRAVENOUS PRN
Status: ACTIVE | OUTPATIENT
Start: 2024-06-07 | End: 2024-06-07

## 2024-06-07 RX ORDER — MIDAZOLAM HYDROCHLORIDE 1 MG/ML
INJECTION INTRAMUSCULAR; INTRAVENOUS
Status: COMPLETED
Start: 2024-06-07 | End: 2024-06-07

## 2024-06-07 RX ORDER — ONDANSETRON 2 MG/ML
4 INJECTION INTRAMUSCULAR; INTRAVENOUS PRN
Status: ACTIVE | OUTPATIENT
Start: 2024-06-07 | End: 2024-06-07

## 2024-06-07 RX ORDER — SODIUM CHLORIDE 9 MG/ML
500 INJECTION, SOLUTION INTRAVENOUS
Status: ACTIVE | OUTPATIENT
Start: 2024-06-07 | End: 2024-06-07

## 2024-06-07 RX ADMIN — MIDAZOLAM HYDROCHLORIDE 1 MG: 2 INJECTION, SOLUTION INTRAMUSCULAR; INTRAVENOUS at 08:30

## 2024-06-07 RX ADMIN — ATORVASTATIN CALCIUM 10 MG: 10 TABLET, FILM COATED ORAL at 06:24

## 2024-06-07 RX ADMIN — FENTANYL CITRATE 50 MCG: 50 INJECTION, SOLUTION INTRAMUSCULAR; INTRAVENOUS at 08:30

## 2024-06-07 RX ADMIN — MIDAZOLAM HYDROCHLORIDE 1 MG: 2 INJECTION, SOLUTION INTRAMUSCULAR; INTRAVENOUS at 08:39

## 2024-06-07 RX ADMIN — FENTANYL CITRATE 25 MCG: 50 INJECTION, SOLUTION INTRAMUSCULAR; INTRAVENOUS at 08:37

## 2024-06-07 RX ADMIN — IOHEXOL 5 ML: 300 INJECTION, SOLUTION INTRAVENOUS at 09:45

## 2024-06-07 RX ADMIN — FENTANYL CITRATE 25 MCG: 50 INJECTION, SOLUTION INTRAMUSCULAR; INTRAVENOUS at 08:41

## 2024-06-07 RX ADMIN — DIAZEPAM 2 MG: 2 TABLET ORAL at 17:19

## 2024-06-07 RX ADMIN — ACETAMINOPHEN 1000 MG: 500 TABLET, FILM COATED ORAL at 22:49

## 2024-06-07 RX ADMIN — CEFAZOLIN 2 G: 2 INJECTION, POWDER, FOR SOLUTION INTRAMUSCULAR; INTRAVENOUS at 22:44

## 2024-06-07 RX ADMIN — MIDAZOLAM HYDROCHLORIDE 1 MG: 2 INJECTION, SOLUTION INTRAMUSCULAR; INTRAVENOUS at 08:37

## 2024-06-07 RX ADMIN — BUTALBITAL, ACETAMINOPHEN AND CAFFEINE 1 TABLET: 325; 50; 40 TABLET ORAL at 20:19

## 2024-06-07 RX ADMIN — CEFAZOLIN 2 G: 2 INJECTION, POWDER, FOR SOLUTION INTRAMUSCULAR; INTRAVENOUS at 05:00

## 2024-06-07 RX ADMIN — CEFAZOLIN 2 G: 2 INJECTION, POWDER, FOR SOLUTION INTRAMUSCULAR; INTRAVENOUS at 14:40

## 2024-06-07 RX ADMIN — MIDAZOLAM HYDROCHLORIDE 1 MG: 2 INJECTION, SOLUTION INTRAMUSCULAR; INTRAVENOUS at 08:48

## 2024-06-07 RX ADMIN — LISINOPRIL 5 MG: 10 TABLET ORAL at 04:38

## 2024-06-07 RX ADMIN — BUTALBITAL, ACETAMINOPHEN AND CAFFEINE 1 TABLET: 325; 50; 40 TABLET ORAL at 04:37

## 2024-06-07 RX ADMIN — DIAZEPAM 2 MG: 2 TABLET ORAL at 10:56

## 2024-06-07 ASSESSMENT — PAIN DESCRIPTION - PAIN TYPE
TYPE: SURGICAL PAIN
TYPE: SURGICAL PAIN;ACUTE PAIN
TYPE: ACUTE PAIN
TYPE: ACUTE PAIN;SURGICAL PAIN

## 2024-06-07 ASSESSMENT — ENCOUNTER SYMPTOMS
FEVER: 0
HEADACHES: 0
COUGH: 0

## 2024-06-07 NOTE — PROGRESS NOTES
Pt presents to IR3. PT was consented by MD at bedside, confirmed by this RN and consent at bedside. Pt transferred to IR3 table in prone position. Patient underwent an image guided lumbar L4-5 drain placement by Dr. Donaldson. Procedure site was marked by MD and verified using imaging guidance. Pt placed on monitor, prepped and draped in a sterile fashion. Vitals were taken every 5 minutes and remained stable during procedure (see doc flow sheet for results). CO2 waveform capnography was monitored and remained WNL throughout procedure. Report called to T6 RN. Pt transported by stretcher with RN to T6.

## 2024-06-07 NOTE — PROGRESS NOTES
Pt arrived for IR, report received from Iker MCKENNA. Pt complains of no pain just anxiety. Lumbar drain assessed, lumbar drain set up with RICU rapid RN at bedside. Per MD to drain 5-15 CC an hour.   Call light in reach, all questions answered.

## 2024-06-07 NOTE — PROGRESS NOTES
4 Eyes Skin Assessment Completed by BALA Rader and BALA Pressley.    Head WDL  Ears WDL  Nose WDL  Mouth WDL  Neck WDL  Breast/Chest WDL  Shoulder Blades WDL  Spine Redness and Incision, pt has incision and lumbar drain from post laminectomy with sutures   (R) Arm/Elbow/Hand WDL  (L) Arm/Elbow/Hand WDL  Abdomen WDL  Groin WDL  Scrotum/Coccyx/Buttocks WDL  (R) Leg Redness  (L) Leg Redness  (R) Heel/Foot/Toe Redness  (L) Heel/Foot/Toe Redness          Devices In Places ECG, Blood Pressure Cuff, Pulse Ox, and Condom Cath, lumbar drain       Interventions In Place Pillows and Low Air Loss Mattress    Possible Skin Injury No    Pictures Uploaded Into Epic N/A  Wound Consult Placed N/A  RN Wound Prevention Protocol Ordered No

## 2024-06-07 NOTE — CARE PLAN
The patient is Watcher - Medium risk of patient condition declining or worsening    Shift Goals  Clinical Goals: monitor lumbar drain output  Patient Goals: rest  Family Goals: no family present    Problem: Knowledge Deficit - Standard  Goal: Patient and family/care givers will demonstrate understanding of plan of care, disease process/condition, diagnostic tests and medications  Outcome: Progressing     Problem: Pain - Standard  Goal: Alleviation of pain or a reduction in pain to the patient’s comfort goal  Outcome: Progressing

## 2024-06-07 NOTE — CARE PLAN
The patient is Watcher - Medium risk of patient condition declining or worsening    Shift Goals  Clinical Goals: stable VS, pain control, surgery today  Patient Goals: pain control, rest  Family Goals: updates on POC    Problem: Knowledge Deficit - Standard  Goal: Patient and family/care givers will demonstrate understanding of plan of care, disease process/condition, diagnostic tests and medications  Outcome: Progressing  Note: Educated pt on POC, monitor VS, labs, IR procedure today, pain control, safety, all questions answered, hourly rounding in progress     Problem: Pain - Standard  Goal: Alleviation of pain or a reduction in pain to the patient’s comfort goal  Outcome: Progressing  Note: Medicated with Tylenol and Fioricet per MAR with adequate pain control, resting in bed comfortably, houlry rounding in progress     Progress made toward(s) clinical / shift goals:      Patient is not progressing towards the following goals:

## 2024-06-07 NOTE — OR SURGEON
Immediate Post- Operative Note        Findings: Lumbar drain placed with access at L4-5 and tip of catheter at L1.      Procedure(s): Lumbar drain.      Estimated Blood Loss: Less than 5 ml        Complications: None            6/7/2024     8:50 AM     Lacho Donaldson M.D.

## 2024-06-07 NOTE — PROGRESS NOTES
Spoke to the patient's wife.  Unfortunately interventional radiology is been exceedingly busy today and could not get the procedure done.  The plan is 8:00 tomorrow.  Will keep his fluids going.  Will give him analgesia tonight.  I updated the wife who is rightly concerned.  She will let the patient know.  He can eat.  N.p.o. at midnight tonight.

## 2024-06-07 NOTE — PROGRESS NOTES
Neurosurgery Progress Note    Subjective:  Resting comfortably  Good spirits today  Unable to have lumbar drain placed yesterday  -Scheduled for today  Mild H/A overnight  MRI:Non-compressive CSF collection  Scheduled for spinal drain with IR this am  On abx    Exam:  Wound: Dry but swollen  LE motor 5/5 throughout bilaterally  VSS  Labs stable    BP  Min: 125/78  Max: 147/89  Pulse  Av.5  Min: 65  Max: 68  Resp  Av.5  Min: 16  Max: 17  Temp  Av.7 °C (98.1 °F)  Min: 35.9 °C (96.6 °F)  Max: 37.1 °C (98.8 °F)  SpO2  Av.5 %  Min: 94 %  Max: 97 %    No data recorded    Recent Labs     24  1726 24  0020   WBC 8.1 6.4   RBC 5.36 4.34*   HEMOGLOBIN 16.2 13.3*   HEMATOCRIT 47.7 39.2*   MCV 89.0 90.3   MCH 30.2 30.6   MCHC 34.0 33.9   RDW 44.0 44.5   PLATELETCT 249 230   MPV 9.0 9.1     Recent Labs     24  1726 24  0020   SODIUM 139 141   POTASSIUM 4.4 4.2   CHLORIDE 102 108   CO2 22 23   GLUCOSE 101* 99   BUN 9 16   CREATININE 0.66 0.70   CALCIUM 9.5 8.4*     Recent Labs     24   APTT 26.4   INR 0.99     Recent Labs     24  0049   REACTMIN 3.8*   CLOTKINET 1.2   CLOTANGL 73.3   MAXCLOTS 61.3   [id number] 1.1   PRCINADP 17.3*   PRCINAA 8.4       Intake/Output                         24 - 24 0659 24 - 24 0659      Total 1900-0659 Total                 Intake    P.O.  --  2245  --  -- --    P.O. -- 2245 -- -- --    Total Intake -- 2245 -- -- --       Output    Urine  --  -- --  --  -- --    Number of Times Voided 2 x 2 x 4 x -- -- --    Total Output -- -- -- -- -- --       Net I/O     -- 2245 2245 -- -- --              Intake/Output Summary (Last 24 hours) at 2024 0710  Last data filed at 2024 2300  Gross per 24 hour   Intake 2245 ml   Output --   Net 2245 ml             ceFAZolin  2 g Q8HRS    NS   Continuous    oxyCODONE immediate-release  5 mg Q6HRS PRN    lisinopril  5 mg DAILY     atorvastatin  10 mg MO, WE + FR    acetaminophen  1,000 mg PRN    diazePAM  2 mg Q6HRS PRN    butalbital/apap/caffeine  1 Tablet Q6HRS PRN       Assessment and Plan:  Hospital day #3    Spinal drain to be placed this am by IR    Plan:  Spinal drain with IR this am  Titrate drain height to 5-15cc per hour (drain tubing and bag at bedside)  1 gram Ancef TID once spinal drain in place  Bed rest and flat once drain is placed  Clamp drain for toilet priviledges   Plan spinal drain for 5 days  Ernie lund and sequentials

## 2024-06-07 NOTE — PROGRESS NOTES
Report received from NOC RN and assumed patient care at 0700. Patient is A&Ox 4, on RA, and laying comfortably in the bed.  Patient reporting a pain level of 0. Call light within reach and bed in lowest position. Reinforced the need to call for assistance. Plan of care discussed and patient does not have any further needs at this time.

## 2024-06-07 NOTE — CARE PLAN
The patient is Stable - Low risk of patient condition declining or worsening    Shift Goals  Clinical Goals: safety, pain control  Patient Goals: rest, comfort  Family Goals: no family present    Progress made toward(s) clinical / shift goals:    Problem: Knowledge Deficit - Standard  Goal: Patient and family/care givers will demonstrate understanding of plan of care, disease process/condition, diagnostic tests and medications  Outcome: Progressing    Educate the patient regarding his plan of care.      Problem: Pain - Standard  Goal: Alleviation of pain or a reduction in pain to the patient’s comfort goal  Outcome: Progressing    Administer pain medications per MAR.       Patient is not progressing towards the following goals:

## 2024-06-07 NOTE — PROGRESS NOTES
Patient reviewed.  No leg pain.  No headache.  Minimal back pain.  Spinal drain placed this morning.  It appears to be working okay.  On examination of his back it is flat.  There is no leakage.  He looks comfortable.  He is afebrile.  Neurologically intact.    Plan    1.  Teds and sequentials    2.   set the levels to drain space 5-15 cc    3.  Can clamp for the toilet    3.  Stool softeners    4.  Ancef coverage    5.  Pressure dressing    6.  Will continue drainage for 5 days and anticipate discontinuing and removing this on WED

## 2024-06-07 NOTE — ASSESSMENT & PLAN NOTE
Patient has a significant amount of anxiety regarding 5 days bedbound after surgery.  - Continue Valium as needed for anxiety

## 2024-06-07 NOTE — PROGRESS NOTES
Hospital Medicine Daily Progress Note    Date of Service  6/7/2024    Chief Complaint  Alec Cruz is a 79 y.o. 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  Admitted 6/5 for swelling at surgical site and CSF leak.    MRI lumbar spine:  There are postsurgical changes as evidenced by L2-3 laminectomy. CSF intense fluid collection is noted in the posterior paraspinal soft tissue adjacent to the laminectomy with extension into the posterior lateral epidural space.    Neurosurgery was consulted from the emergency room.  Patient is n.p.o. for drain placement today.    Interval Problem Update  6/6: Vital stable overnight.  MRI lumbar spine completed.  WBC stable at 8.1.  Hb stable at 16.  Creatinine 0.66.    6/7: Vital stable overnight.  Hb 13.3 from 16.2.  Unfortunately, due to scheduling constraints with interventional radiology yesterday patient's procedure was delayed until this morning.  Patient is currently n.p.o. for neurosurgical intervention today.    I have discussed this patient's plan of care and discharge plan at IDT rounds today with Case Management, Nursing, Nursing leadership, and other members of the IDT team.    Consultants/Specialty  neurosurgery    Code Status  Full Code    Disposition  The patient is not medically cleared for discharge to home or a post-acute facility.      I have placed the appropriate orders for post-discharge needs.    Review of Systems  Review of Systems   Constitutional:  Negative for fever and malaise/fatigue.   Respiratory:  Negative for cough.    Cardiovascular:  Negative for chest pain and leg swelling.   Neurological:  Negative for headaches.        Physical Exam  Temp:  [35.9 °C (96.6 °F)-37.1 °C (98.8 °F)] 36.8 °C (98.2 °F)  Pulse:  [55-85]  55  Resp:  [10-25] 12  BP: (110-149)/(69-94) 134/77  SpO2:  [92 %-98 %] 92 %    Physical Exam  Vitals and nursing note reviewed.   Constitutional:       General: He is not in acute distress.     Appearance: Normal appearance. He is not ill-appearing.   HENT:      Head: Normocephalic.      Mouth/Throat:      Mouth: Mucous membranes are moist.      Pharynx: Oropharynx is clear.   Cardiovascular:      Rate and Rhythm: Normal rate and regular rhythm.      Heart sounds: No murmur heard.  Pulmonary:      Effort: Pulmonary effort is normal. No respiratory distress.      Breath sounds: Normal breath sounds. No wheezing.   Abdominal:      General: Abdomen is flat. Bowel sounds are normal. There is no distension.      Palpations: Abdomen is soft.      Tenderness: There is no abdominal tenderness.   Musculoskeletal:         General: No swelling. Normal range of motion.      Cervical back: Normal range of motion. No tenderness.   Skin:     General: Skin is warm and dry.   Neurological:      Mental Status: He is alert and oriented to person, place, and time.   Psychiatric:         Mood and Affect: Mood normal.         Behavior: Behavior normal.         Fluids    Intake/Output Summary (Last 24 hours) at 6/7/2024 1147  Last data filed at 6/7/2024 1100  Gross per 24 hour   Intake 2345 ml   Output --   Net 2345 ml       Laboratory  Recent Labs     06/05/24  1726 06/07/24  0020   WBC 8.1 6.4   RBC 5.36 4.34*   HEMOGLOBIN 16.2 13.3*   HEMATOCRIT 47.7 39.2*   MCV 89.0 90.3   MCH 30.2 30.6   MCHC 34.0 33.9   RDW 44.0 44.5   PLATELETCT 249 230   MPV 9.0 9.1     Recent Labs     06/05/24  1726 06/07/24  0020   SODIUM 139 141   POTASSIUM 4.4 4.2   CHLORIDE 102 108   CO2 22 23   GLUCOSE 101* 99   BUN 9 16   CREATININE 0.66 0.70   CALCIUM 9.5 8.4*     Recent Labs     06/05/24  1726   APTT 26.4   INR 0.99               Imaging  MR-LUMBAR SPINE-WITH & W/O   Final Result      1.  There are postsurgical changes as evidenced by L2-3 laminectomy.  CSF intense fluid collection is noted in the posterior paraspinal soft tissue adjacent to the laminectomy with extension into the posterior lateral epidural space. There is moderate    central canal stenosis at this level. Post gadolinium sequences demonstrates peripheral contrast enhancement. This finding likely represent postsurgical pseudomeningocele. The possibility of secondary infection cannot be excluded based on this finding.   2.  Degenerative changes as described above.      IR-UNLISTED FLUORO INTERVENTIONAL PROC    (Results Pending)        Assessment/Plan  * CSF leak  Assessment & Plan  S/p L2/L3 Laminectomy 4/22/2024 with JACKI. Complicated by CSF leak x2.   MRI lumbar spine: CSF intense fluid collection posterior paraspinal soft tissue.  -Neurosurgery is following closely; canceled on 6/6 due to IR availability   - N.p.o. for neurosurgery interventions today; drain placement planned  - Continue to follow neurosurgery recommendations  - Oxycodone as needed pain  - Neurosurgery recommending bedrest for 5 days after drain placement  - Titrate drain level to 5 to 15 cc/h  - Okay to clamp drain for toileting    Anxiety  Assessment & Plan  Patient has a significant amount of anxiety regarding 5 days bedbound after surgery.  - Continue Valium as needed for anxiety    ACP (advance care planning)- (present on admission)  Assessment & Plan   He was explained that he will be n.p.o. at midnight for drain placement in the a.m.  He was asked about CODE STATUS, to which he states he would like to be full code and to have everything done, including chest compressions and intubation if needed.  Above per previous hospitalist     Hyperlipemia- (present on admission)  Assessment & Plan  Continue home atorvastatin 10mg daily     Hypertension  Assessment & Plan  SBP ranging 126-147  - Continue home lisinopril         VTE prophylaxis:   SCDs/TEDs   pharmacologic prophylaxis contraindicated due to surgery      I have performed  a physical exam and reviewed and updated ROS and Plan today (6/7/2024). In review of yesterday's note (6/6/2024), there are no changes except as documented above.

## 2024-06-08 LAB
ANION GAP SERPL CALC-SCNC: 11 MMOL/L (ref 7–16)
BUN SERPL-MCNC: 12 MG/DL (ref 8–22)
CALCIUM SERPL-MCNC: 8.6 MG/DL (ref 8.5–10.5)
CHLORIDE SERPL-SCNC: 108 MMOL/L (ref 96–112)
CO2 SERPL-SCNC: 21 MMOL/L (ref 20–33)
CREAT SERPL-MCNC: 0.67 MG/DL (ref 0.5–1.4)
ERYTHROCYTE [DISTWIDTH] IN BLOOD BY AUTOMATED COUNT: 43.2 FL (ref 35.9–50)
GFR SERPLBLD CREATININE-BSD FMLA CKD-EPI: 96 ML/MIN/1.73 M 2
GLUCOSE SERPL-MCNC: 97 MG/DL (ref 65–99)
HCT VFR BLD AUTO: 40.4 % (ref 42–52)
HGB BLD-MCNC: 14 G/DL (ref 14–18)
MAGNESIUM SERPL-MCNC: 1.9 MG/DL (ref 1.5–2.5)
MCH RBC QN AUTO: 30.4 PG (ref 27–33)
MCHC RBC AUTO-ENTMCNC: 34.7 G/DL (ref 32.3–36.5)
MCV RBC AUTO: 87.8 FL (ref 81.4–97.8)
PLATELET # BLD AUTO: 220 K/UL (ref 164–446)
PMV BLD AUTO: 9 FL (ref 9–12.9)
POTASSIUM SERPL-SCNC: 4.1 MMOL/L (ref 3.6–5.5)
RBC # BLD AUTO: 4.6 M/UL (ref 4.7–6.1)
SODIUM SERPL-SCNC: 140 MMOL/L (ref 135–145)
WBC # BLD AUTO: 5.9 K/UL (ref 4.8–10.8)

## 2024-06-08 PROCEDURE — 700105 HCHG RX REV CODE 258: Performed by: PHYSICIAN ASSISTANT

## 2024-06-08 PROCEDURE — 770000 HCHG ROOM/CARE - INTERMEDIATE ICU *

## 2024-06-08 PROCEDURE — 99233 SBSQ HOSP IP/OBS HIGH 50: CPT | Performed by: HOSPITALIST

## 2024-06-08 PROCEDURE — 700102 HCHG RX REV CODE 250 W/ 637 OVERRIDE(OP): Performed by: STUDENT IN AN ORGANIZED HEALTH CARE EDUCATION/TRAINING PROGRAM

## 2024-06-08 PROCEDURE — 700102 HCHG RX REV CODE 250 W/ 637 OVERRIDE(OP): Performed by: NEUROLOGICAL SURGERY

## 2024-06-08 PROCEDURE — 85027 COMPLETE CBC AUTOMATED: CPT

## 2024-06-08 PROCEDURE — 700111 HCHG RX REV CODE 636 W/ 250 OVERRIDE (IP): Mod: JZ | Performed by: NEUROLOGICAL SURGERY

## 2024-06-08 PROCEDURE — 83735 ASSAY OF MAGNESIUM: CPT

## 2024-06-08 PROCEDURE — A9270 NON-COVERED ITEM OR SERVICE: HCPCS | Performed by: NEUROLOGICAL SURGERY

## 2024-06-08 PROCEDURE — A9270 NON-COVERED ITEM OR SERVICE: HCPCS

## 2024-06-08 PROCEDURE — 80048 BASIC METABOLIC PNL TOTAL CA: CPT

## 2024-06-08 PROCEDURE — 700111 HCHG RX REV CODE 636 W/ 250 OVERRIDE (IP): Performed by: PHYSICIAN ASSISTANT

## 2024-06-08 PROCEDURE — A9270 NON-COVERED ITEM OR SERVICE: HCPCS | Performed by: STUDENT IN AN ORGANIZED HEALTH CARE EDUCATION/TRAINING PROGRAM

## 2024-06-08 PROCEDURE — 700102 HCHG RX REV CODE 250 W/ 637 OVERRIDE(OP)

## 2024-06-08 RX ORDER — DOCUSATE SODIUM 100 MG/1
100 CAPSULE, LIQUID FILLED ORAL ONCE
Status: COMPLETED | OUTPATIENT
Start: 2024-06-08 | End: 2024-06-08

## 2024-06-08 RX ORDER — ENOXAPARIN SODIUM 100 MG/ML
40 INJECTION SUBCUTANEOUS DAILY
Status: COMPLETED | OUTPATIENT
Start: 2024-06-08 | End: 2024-06-10

## 2024-06-08 RX ADMIN — DIAZEPAM 2 MG: 2 TABLET ORAL at 01:05

## 2024-06-08 RX ADMIN — BUTALBITAL, ACETAMINOPHEN AND CAFFEINE 1 TABLET: 325; 50; 40 TABLET ORAL at 05:14

## 2024-06-08 RX ADMIN — BUTALBITAL, ACETAMINOPHEN AND CAFFEINE 1 TABLET: 325; 50; 40 TABLET ORAL at 21:04

## 2024-06-08 RX ADMIN — DIAZEPAM 2 MG: 2 TABLET ORAL at 22:05

## 2024-06-08 RX ADMIN — ENOXAPARIN SODIUM 40 MG: 100 INJECTION SUBCUTANEOUS at 10:03

## 2024-06-08 RX ADMIN — CEFAZOLIN 2 G: 2 INJECTION, POWDER, FOR SOLUTION INTRAMUSCULAR; INTRAVENOUS at 05:10

## 2024-06-08 RX ADMIN — DIAZEPAM 2 MG: 2 TABLET ORAL at 08:13

## 2024-06-08 RX ADMIN — BUTALBITAL, ACETAMINOPHEN AND CAFFEINE 1 TABLET: 325; 50; 40 TABLET ORAL at 11:35

## 2024-06-08 RX ADMIN — CEFAZOLIN 2 G: 2 INJECTION, POWDER, FOR SOLUTION INTRAMUSCULAR; INTRAVENOUS at 14:17

## 2024-06-08 RX ADMIN — DOCUSATE SODIUM 100 MG: 100 CAPSULE, LIQUID FILLED ORAL at 21:02

## 2024-06-08 RX ADMIN — DIAZEPAM 2 MG: 2 TABLET ORAL at 16:08

## 2024-06-08 RX ADMIN — LISINOPRIL 5 MG: 10 TABLET ORAL at 05:10

## 2024-06-08 RX ADMIN — CEFAZOLIN 2 G: 2 INJECTION, POWDER, FOR SOLUTION INTRAMUSCULAR; INTRAVENOUS at 21:07

## 2024-06-08 ASSESSMENT — ENCOUNTER SYMPTOMS
CARDIOVASCULAR NEGATIVE: 1
COUGH: 0
NERVOUS/ANXIOUS: 1
CONSTITUTIONAL NEGATIVE: 1
RESPIRATORY NEGATIVE: 1
CONSTIPATION: 1
FEVER: 0
EYES NEGATIVE: 1
BACK PAIN: 1
NEUROLOGICAL NEGATIVE: 1
HEADACHES: 0

## 2024-06-08 ASSESSMENT — PAIN DESCRIPTION - PAIN TYPE
TYPE: ACUTE PAIN

## 2024-06-08 ASSESSMENT — FIBROSIS 4 INDEX: FIB4 SCORE: 0.86

## 2024-06-08 NOTE — PROGRESS NOTES
Neurosurgery Progress Note    Subjective:  He looks good  Mild HA  No leg symptoms      Exam:  Moto5 5/5 both LE  GCS 15  Wound- dried blood on bottom. Incision looks flat. No drainage  95 cc lumbar drain since 12 mn 135 ccc yesterday . 5-15 cc/hr     BP  Min: 106/69  Max: 149/85  Pulse  Av.4  Min: 46  Max: 91  Resp  Av  Min: 10  Max: 37  Temp  Av.8 °C (98.3 °F)  Min: 36.6 °C (97.8 °F)  Max: 37.1 °C (98.8 °F)  SpO2  Av.6 %  Min: 91 %  Max: 98 %    No data recorded    Recent Labs     24  1726 24  0020 24  0211   WBC 8.1 6.4 5.9   RBC 5.36 4.34* 4.60*   HEMOGLOBIN 16.2 13.3* 14.0   HEMATOCRIT 47.7 39.2* 40.4*   MCV 89.0 90.3 87.8   MCH 30.2 30.6 30.4   MCHC 34.0 33.9 34.7   RDW 44.0 44.5 43.2   PLATELETCT 249 230 220   MPV 9.0 9.1 9.0     Recent Labs     24  1726 24  0020 24  0211   SODIUM 139 141 140   POTASSIUM 4.4 4.2 4.1   CHLORIDE 102 108 108   CO2 22 23 21   GLUCOSE 101* 99 97   BUN 9 16 12   CREATININE 0.66 0.70 0.67   CALCIUM 9.5 8.4* 8.6     Recent Labs     24   APTT 26.4   INR 0.99     Recent Labs     24  0049   REACTMIN 3.8*   CLOTKINET 1.2   CLOTANGL 73.3   MAXCLOTS 61.3   [id number] 1.1   PRCINADP 17.3*   PRCINAA 8.4       Intake/Output                         24 - 24 - 24 Total  Total                 Intake    P.O.  100  -- 100  --  -- --    P.O. 100 -- 100 -- -- --    Total Intake 100 -- 100 -- -- --       Output    Urine  350  -- 350  --  -- --    Number of Times Voided -- 1 x 1 x -- -- --    Output (mL) (External Urinary Catheter (Condom)) 350 -- 350 -- -- --    Cerebrospinal Fluid  77  139 216  13  -- 13    CSF Output (mL) (CSF Drain Group Lumbar) 77 139 216 13 -- 13    Total Output 427 139 566 13 -- 13       Net I/O     -327 -139 -466 -13 -- -13              Intake/Output Summary (Last 24 hours) at 2024 4356  Last data filed at  6/8/2024 0700  Gross per 24 hour   Intake 100 ml   Output 579 ml   Net -479 ml             ceFAZolin  2 g Q8HRS    oxyCODONE immediate-release  5 mg Q6HRS PRN    lisinopril  5 mg DAILY    atorvastatin  10 mg MO, WE + FR    acetaminophen  1,000 mg PRN    diazePAM  2 mg Q6HRS PRN    butalbital/apap/caffeine  1 Tablet Q6HRS PRN       Assessment and Plan:  Hospital day # 4  Lumbar drain day #2    Plan    Pressure dressing on upper wound  Lovenox  Continue drainage until WED am

## 2024-06-08 NOTE — PROGRESS NOTES
Hospital Medicine Daily Progress Note    Date of Service  6/8/2024    Chief Complaint  Alec Cruz is a 79 y.o. 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  Admitted 6/5 for swelling at surgical site and CSF leak.  MRI lumbar spine:  There are postsurgical changes as evidenced by L2-3 laminectomy. CSF intense fluid collection is noted in the posterior paraspinal soft tissue adjacent to the laminectomy with extension into the posterior lateral epidural space.  Neurosurgery was consulted from the emergency room.   The patient subsequently underwent a lumbar drain placement by interventional radiology at L4-5 with the tip of the catheter at L1.    Interval Problem Update  Patient seen and examined today.  Data, Medication data reviewed.  Case discussed with nursing as available.  Plan of Care reviewed with patient and notified of changes.  6/6: Vital stable overnight.  MRI lumbar spine completed.  WBC stable at 8.1.  Hb stable at 16.  Creatinine 0.66.  6/7: Vital stable overnight.  Hb 13.3 from 16.2.  Unfortunately, due to scheduling constraints with interventional radiology yesterday patient's procedure was delayed until this morning.  Patient is currently n.p.o. for neurosurgical intervention today.  6/8/2024 the patient is laying on his side, he is fairly comfortable, he does have anxiety which he treats with Valium successfully, the drain is still draining significant amount of CSF, the patient evaluated this morning by neurosurgery, output approximately 5 to 15 cc/h, the patient to have his drain removed possibly Wednesday.  The patient remains in IMCU for close monitoring with a spinal drain in place  The patient is afebrile, heart rate in the 50s and 70s, respiration unlabored,  blood pressure in the 90s to 100s over 60s, laboratory data evaluated, CBC fairly unchanged, platelet count 220, chemistry unchanged,  Discussed current plan with the patient with wife at the bedside  I have discussed this patient's plan of care and discharge plan at IDT rounds today with Case Management, Nursing, Nursing leadership, and other members of the IDT team.    Consultants/Specialty  neurosurgery    Code Status  Full Code    Disposition  The patient is not medically cleared for discharge to home or a post-acute facility.  Anticipate discharge to: home with close outpatient follow-up    I have placed the appropriate orders for post-discharge needs.    Review of Systems  Review of Systems   Constitutional: Negative.  Negative for fever and malaise/fatigue.   HENT: Negative.     Eyes: Negative.    Respiratory: Negative.  Negative for cough.    Cardiovascular: Negative.  Negative for chest pain and leg swelling.   Gastrointestinal:  Positive for constipation.   Genitourinary: Negative.    Musculoskeletal:  Positive for back pain.   Skin: Negative.    Neurological: Negative.  Negative for headaches.   Endo/Heme/Allergies: Negative.    Psychiatric/Behavioral:  The patient is nervous/anxious.    All other systems reviewed and are negative.       Physical Exam  Temp:  [36.6 °C (97.8 °F)-36.8 °C (98.2 °F)] 36.6 °C (97.8 °F)  Pulse:  [46-91] 54  Resp:  [10-37] 18  BP: ()/(55-94) 97/63  SpO2:  [91 %-98 %] 92 %    Physical Exam  Vitals and nursing note reviewed.   Constitutional:       General: He is not in acute distress.     Appearance: Normal appearance. He is not ill-appearing.   HENT:      Head: Normocephalic.      Mouth/Throat:      Mouth: Mucous membranes are moist.      Pharynx: Oropharynx is clear.   Cardiovascular:      Rate and Rhythm: Normal rate and regular rhythm.      Heart sounds: No murmur heard.  Pulmonary:      Effort: Pulmonary effort is normal. No respiratory distress.      Breath sounds:  Normal breath sounds. No wheezing.   Abdominal:      General: Abdomen is flat. Bowel sounds are normal. There is no distension.      Palpations: Abdomen is soft.      Tenderness: There is no abdominal tenderness.   Musculoskeletal:         General: No swelling. Normal range of motion.      Cervical back: Normal range of motion. No tenderness.      Comments: Spinal drain in place in the L-spine area  Dressing in place  No leak   Skin:     General: Skin is warm and dry.   Neurological:      Mental Status: He is alert and oriented to person, place, and time.   Psychiatric:         Mood and Affect: Mood normal.         Behavior: Behavior normal.         Fluids    Intake/Output Summary (Last 24 hours) at 6/8/2024 0754  Last data filed at 6/8/2024 0700  Gross per 24 hour   Intake 100 ml   Output 579 ml   Net -479 ml       Laboratory  Recent Labs     06/05/24 1726 06/07/24  0020 06/08/24  0211   WBC 8.1 6.4 5.9   RBC 5.36 4.34* 4.60*   HEMOGLOBIN 16.2 13.3* 14.0   HEMATOCRIT 47.7 39.2* 40.4*   MCV 89.0 90.3 87.8   MCH 30.2 30.6 30.4   MCHC 34.0 33.9 34.7   RDW 44.0 44.5 43.2   PLATELETCT 249 230 220   MPV 9.0 9.1 9.0     Recent Labs     06/05/24  1726 06/07/24  0020 06/08/24  0211   SODIUM 139 141 140   POTASSIUM 4.4 4.2 4.1   CHLORIDE 102 108 108   CO2 22 23 21   GLUCOSE 101* 99 97   BUN 9 16 12   CREATININE 0.66 0.70 0.67   CALCIUM 9.5 8.4* 8.6     Recent Labs     06/05/24 1726   APTT 26.4   INR 0.99               Imaging  MR-LUMBAR SPINE-WITH & W/O   Final Result      1.  There are postsurgical changes as evidenced by L2-3 laminectomy. CSF intense fluid collection is noted in the posterior paraspinal soft tissue adjacent to the laminectomy with extension into the posterior lateral epidural space. There is moderate    central canal stenosis at this level. Post gadolinium sequences demonstrates peripheral contrast enhancement. This finding likely represent postsurgical pseudomeningocele. The possibility of secondary  infection cannot be excluded based on this finding.   2.  Degenerative changes as described above.      IR-UNLISTED FLUORO INTERVENTIONAL PROC    (Results Pending)        Assessment/Plan  * CSF leak  Assessment & Plan  S/p L2/L3 Laminectomy 4/22/2024 with JACKI. Complicated by CSF leak x2.   MRI lumbar spine: CSF intense fluid collection posterior paraspinal soft tissue.  -Neurosurgery is following closely  - S/p IR drain placement, monitoring output  - Continue to follow neurosurgery recommendations  - Oxycodone as needed pain  - Neurosurgery recommending bedrest for 5 days after drain placement  - Titrate drain level to 5 to 15 cc/h  - Okay to clamp drain for toileting    Anxiety  Assessment & Plan  Patient has a significant amount of anxiety regarding 5 days bedbound after surgery.  - Continue Valium as needed for anxiety    Pseudomeningocele  Assessment & Plan  History of, neurosurgery following    ACP (advance care planning)- (present on admission)  Assessment & Plan  Full CODE STATUS confirmed    Hyperlipemia- (present on admission)  Assessment & Plan  Continue home atorvastatin 10mg daily     Hypertension  Assessment & Plan  SBP ranging 126-147  - Continue home lisinopril    Plan  6/8  Continue with current plan  Monitor patient's drain output, since this morning approximately 61 cc, yesterday total recorded 216,  Pain control, anxiolytics,  Close monitoring  See orders  Patient is has a high medical complexity, complex decision making and is at high risk for complication, morbidity, and mortality.  Patient is down to close observation secondary to for spinal drain in place  I spent 51 minutes, reviewing the chart, obtaining and/or reviewing separately obtained history. Performing a medically appropriate examination and evaluation.  Counseling and educating the patient. Ordering and reviewing medications, tests, or procedures.   Documenting clinical information in EPIC. Independently interpreting results and  communicating results to patient. Discussing future disposition of care with patient, RN and case management.      VTE prophylaxis:    enoxaparin ppx  Lovenox okay per neurosurgical note    I have performed a physical exam and reviewed and updated ROS and Plan today (6/8/2024). In review of yesterday's note (6/7/2024), there are no changes except as documented above.    Please note that this dictation was created using voice recognition software. I have made every reasonable attempt to correct obvious errors, but I expect that there are errors of grammar and possibly context that I did not discover before finalizing the note.

## 2024-06-08 NOTE — CARE PLAN
The patient is Watcher - Medium risk of patient condition declining or worsening    Shift Goals  Clinical Goals: lumbar drain 5-15mls/hr, hemodynamic stability  Patient Goals: rest, comfort  Family Goals: rest, updates    Progress made toward(s) clinical / shift goals:      Problem: Knowledge Deficit - Standard  Goal: Patient and family/care givers will demonstrate understanding of plan of care, disease process/condition, diagnostic tests and medications  Outcome: Progressing     Problem: Pain - Standard  Goal: Alleviation of pain or a reduction in pain to the patient’s comfort goal  Outcome: Progressing

## 2024-06-08 NOTE — PROGRESS NOTES
4 Eyes Skin Assessment Completed by BALA Montoya and BALA Gomez.    Head WDL  Ears WDL  Nose WDL  Mouth WDL  Neck WDL  Breast/Chest WDL  Shoulder Blades WDL  Spine Incision redness, incision from lumbar drain, dressing in place CDI  (R) Arm/Elbow/Hand WDL  (L) Arm/Elbow/Hand WDL  Abdomen WDL  Groin WDL  Scrotum/Coccyx/Buttocks WDL  (R) Leg WDL  (L) Leg WDL  (R) Heel/Foot/Toe WDL  (L) Heel/Foot/Toe WDL          Devices In Places ECG, Blood Pressure Cuff, Pulse Ox, SCD's, and DOMINIQUE's      Interventions In Place Pillows, Q2 Turns, Low Air Loss Mattress, Heels Loaded W/Pillows, and Pressure Redistribution Mattress    Possible Skin Injury No (incision from lumbar drain surgery)    Pictures Uploaded Into Epic N/A  Wound Consult Placed N/A  RN Wound Prevention Protocol Ordered No

## 2024-06-08 NOTE — CARE PLAN
The patient is Watcher - Medium risk of patient condition declining or worsening    Shift Goals  Clinical Goals: lumbar drain 5-15mls/hr, hemodynamic stability  Patient Goals: rest, comfort  Family Goals: rest, updates    Progress made toward(s) clinical / shift goals:      Problem: Knowledge Deficit - Standard  Goal: Patient and family/care givers will demonstrate understanding of plan of care, disease process/condition, diagnostic tests and medications  Outcome: Progressing  Note: Pt educated regarding plan of care and medications. All questions answered.      Problem: Pain - Standard  Goal: Alleviation of pain or a reduction in pain to the patient’s comfort goal  Outcome: Progressing  Note: Pt assessed for pain regularly and medicated PRN per MAR.

## 2024-06-09 PROCEDURE — A9270 NON-COVERED ITEM OR SERVICE: HCPCS | Performed by: STUDENT IN AN ORGANIZED HEALTH CARE EDUCATION/TRAINING PROGRAM

## 2024-06-09 PROCEDURE — 700111 HCHG RX REV CODE 636 W/ 250 OVERRIDE (IP): Performed by: PHYSICIAN ASSISTANT

## 2024-06-09 PROCEDURE — A9270 NON-COVERED ITEM OR SERVICE: HCPCS | Performed by: NEUROLOGICAL SURGERY

## 2024-06-09 PROCEDURE — 700102 HCHG RX REV CODE 250 W/ 637 OVERRIDE(OP): Performed by: NEUROLOGICAL SURGERY

## 2024-06-09 PROCEDURE — 700105 HCHG RX REV CODE 258: Performed by: PHYSICIAN ASSISTANT

## 2024-06-09 PROCEDURE — 700102 HCHG RX REV CODE 250 W/ 637 OVERRIDE(OP): Performed by: STUDENT IN AN ORGANIZED HEALTH CARE EDUCATION/TRAINING PROGRAM

## 2024-06-09 PROCEDURE — 770000 HCHG ROOM/CARE - INTERMEDIATE ICU *

## 2024-06-09 PROCEDURE — A9270 NON-COVERED ITEM OR SERVICE: HCPCS

## 2024-06-09 PROCEDURE — 99233 SBSQ HOSP IP/OBS HIGH 50: CPT | Performed by: HOSPITALIST

## 2024-06-09 PROCEDURE — 700111 HCHG RX REV CODE 636 W/ 250 OVERRIDE (IP): Mod: JZ | Performed by: NEUROLOGICAL SURGERY

## 2024-06-09 PROCEDURE — 700102 HCHG RX REV CODE 250 W/ 637 OVERRIDE(OP)

## 2024-06-09 RX ADMIN — ENOXAPARIN SODIUM 40 MG: 100 INJECTION SUBCUTANEOUS at 05:10

## 2024-06-09 RX ADMIN — ACETAMINOPHEN 1000 MG: 500 TABLET, FILM COATED ORAL at 20:00

## 2024-06-09 RX ADMIN — CEFAZOLIN 2 G: 2 INJECTION, POWDER, FOR SOLUTION INTRAMUSCULAR; INTRAVENOUS at 22:06

## 2024-06-09 RX ADMIN — BUTALBITAL, ACETAMINOPHEN AND CAFFEINE 1 TABLET: 325; 50; 40 TABLET ORAL at 17:17

## 2024-06-09 RX ADMIN — CEFAZOLIN 2 G: 2 INJECTION, POWDER, FOR SOLUTION INTRAMUSCULAR; INTRAVENOUS at 05:14

## 2024-06-09 RX ADMIN — DIAZEPAM 2 MG: 2 TABLET ORAL at 04:33

## 2024-06-09 RX ADMIN — LISINOPRIL 5 MG: 10 TABLET ORAL at 05:10

## 2024-06-09 RX ADMIN — ACETAMINOPHEN 1000 MG: 500 TABLET, FILM COATED ORAL at 10:09

## 2024-06-09 RX ADMIN — DIAZEPAM 2 MG: 2 TABLET ORAL at 23:02

## 2024-06-09 RX ADMIN — BUTALBITAL, ACETAMINOPHEN AND CAFFEINE 1 TABLET: 325; 50; 40 TABLET ORAL at 05:10

## 2024-06-09 RX ADMIN — BUTALBITAL, ACETAMINOPHEN AND CAFFEINE 1 TABLET: 325; 50; 40 TABLET ORAL at 23:02

## 2024-06-09 RX ADMIN — DIAZEPAM 2 MG: 2 TABLET ORAL at 17:17

## 2024-06-09 RX ADMIN — CEFAZOLIN 2 G: 2 INJECTION, POWDER, FOR SOLUTION INTRAMUSCULAR; INTRAVENOUS at 13:07

## 2024-06-09 RX ADMIN — DIAZEPAM 2 MG: 2 TABLET ORAL at 11:06

## 2024-06-09 ASSESSMENT — PAIN DESCRIPTION - PAIN TYPE
TYPE: ACUTE PAIN

## 2024-06-09 ASSESSMENT — ENCOUNTER SYMPTOMS
HEADACHES: 0
RESPIRATORY NEGATIVE: 1
NERVOUS/ANXIOUS: 1
CONSTITUTIONAL NEGATIVE: 1
BACK PAIN: 1
FEVER: 0
EYES NEGATIVE: 1
CARDIOVASCULAR NEGATIVE: 1
COUGH: 0
NEUROLOGICAL NEGATIVE: 1

## 2024-06-09 ASSESSMENT — FIBROSIS 4 INDEX: FIB4 SCORE: 0.86

## 2024-06-09 NOTE — PROGRESS NOTES
4 Eyes Skin Assessment Completed by BALA Montoya and BALA Day.    Head WDL  Ears WDL  Nose WDL  Mouth WDL  Neck WDL  Breast/Chest WDL  Shoulder Blades WDL  Spine Redness and Incision incision from lumbar drain, dressin gin place CDI  (R) Arm/Elbow/Hand WDL  (L) Arm/Elbow/Hand WDL  Abdomen WDL  Groin WDL  Scrotum/Coccyx/Buttocks WDL  (R) Leg WDL  (L) Leg WDL  (R) Heel/Foot/Toe WDL  (L) Heel/Foot/Toe WDL          Devices In Places ECG, Tele Box, Blood Pressure Cuff, and Pulse Ox   SCD's and DOMINIQUE hose    Interventions In Place Q2 Turns, Low Air Loss Mattress, Heels Loaded W/Pillows, and Pressure Redistribution Mattress    Possible Skin Injury No    Pictures Uploaded Into Epic N/A  Wound Consult Placed N/A  RN Wound Prevention Protocol Ordered No

## 2024-06-09 NOTE — PROGRESS NOTES
Neurosurgery Progress Note    Subjective:  Lumbar drain placed  2024  Lovenox started   He looks good  Mild HA  No leg symptoms      Exam:  Moto5 5/5 both LE  GCS 15  Wound- dried blood on bottom. Incision looks flat. No drainage  75 cc lumbar drain since 12 mn (10hrs)  262 cc yesterday . 5-15 cc/hr     BP  Min: 94/54  Max: 135/73  Pulse  Av.5  Min: 52  Max: 82  Resp  Av.4  Min: 11  Max: 40  Temp  Av.2 °C (97.2 °F)  Min: 36 °C (96.8 °F)  Max: 36.4 °C (97.6 °F)  SpO2  Av.4 %  Min: 89 %  Max: 96 %    No data recorded    Recent Labs     24  0020 24  0211   WBC 6.4 5.9   RBC 4.34* 4.60*   HEMOGLOBIN 13.3* 14.0   HEMATOCRIT 39.2* 40.4*   MCV 90.3 87.8   MCH 30.6 30.4   MCHC 33.9 34.7   RDW 44.5 43.2   PLATELETCT 230 220   MPV 9.1 9.0     Recent Labs     24  0020 24  0211   SODIUM 141 140   POTASSIUM 4.2 4.1   CHLORIDE 108 108   CO2 23 21   GLUCOSE 99 97   BUN 16 12   CREATININE 0.70 0.67   CALCIUM 8.4* 8.6                   Intake/Output                         24 07 - 24 0659 24 - 06/10/24 0659      Total 1900-0659 Total                 Intake    P.O.  480  -- 480  --  -- --    P.O. 480 -- 480 -- -- --    Total Intake 480 -- 480 -- -- --       Output    Urine  1100  -- 1100  --  -- --    Number of Times Voided 1 x -- 1 x -- -- --    Urine Void (mL) 300 -- 300 -- -- --    Output (mL) (External Urinary Catheter (Condom)) 800 -- 800 -- -- --    Cerebrospinal Fluid  128  112 240  15  -- 15    CSF Output (mL) (CSF Drain Group Lumbar) 128 112 240 15 -- 15    Total Output 2836 044 7276 15 -- 15       Net I/O     -748 -112 -860 -15 -- -15              Intake/Output Summary (Last 24 hours) at 2024 0954  Last data filed at 2024 0700  Gross per 24 hour   Intake --   Output 520 ml   Net -520 ml             enoxaparin (LOVENOX) injection  40 mg DAILY    ceFAZolin  2 g Q8HRS    oxyCODONE immediate-release  5 mg Q6HRS PRN     lisinopril  5 mg DAILY    atorvastatin  10 mg MO, WE + FR    acetaminophen  1,000 mg PRN    diazePAM  2 mg Q6HRS PRN    butalbital/apap/caffeine  1 Tablet Q6HRS PRN       Assessment and Plan:  Hospital day # 5  Lumbar drain day #3    Continue lumbar drain    Plan  Pressure dressing on upper wound  Continue Lovenox  Continue drainage until WED am

## 2024-06-09 NOTE — PROGRESS NOTES
Hospital Medicine Daily Progress Note    Date of Service  6/9/2024    Chief Complaint  Alec Cruz is a 79 y.o. 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  Admitted 6/5 for swelling at surgical site and CSF leak.  MRI lumbar spine:  There are postsurgical changes as evidenced by L2-3 laminectomy. CSF intense fluid collection is noted in the posterior paraspinal soft tissue adjacent to the laminectomy with extension into the posterior lateral epidural space.  Neurosurgery was consulted from the emergency room.   The patient subsequently underwent a lumbar drain placement by interventional radiology at L4-5 with the tip of the catheter at L1.    Interval Problem Update  Patient seen and examined today.  Data, Medication data reviewed.  Case discussed with nursing as available.  Plan of Care reviewed with patient and notified of changes.  6/6: Vital stable overnight.  MRI lumbar spine completed.  WBC stable at 8.1.  Hb stable at 16.  Creatinine 0.66.  6/7: Vital stable overnight.  Hb 13.3 from 16.2.  Unfortunately, due to scheduling constraints with interventional radiology yesterday patient's procedure was delayed until this morning.  Patient is currently n.p.o. for neurosurgical intervention today.  6/8/2024 the patient is laying on his side, he is fairly comfortable, he does have anxiety which he treats with Valium successfully, the drain is still draining significant amount of CSF, the patient evaluated this morning by neurosurgery, output approximately 5 to 15 cc/h, the patient to have his drain removed possibly Wednesday.  The patient remains in IMCU for close monitoring with a spinal drain in place  The patient is afebrile, heart rate in the 50s and 70s, respiration unlabored,  blood pressure in the 90s to 100s over 60s, laboratory data evaluated, CBC fairly unchanged, platelet count 220, chemistry unchanged,  Discussed current plan with the patient with wife at the bedside  6/9 the patient with some back discomfort, otherwise currently tolerating the CSF drainage, mild headache  Currently no other changes, his back situation is evaluated by spine surgery  The patient denies nausea vomiting, no visual changes  No fevers chills    I have discussed this patient's plan of care and discharge plan at IDT rounds today with Case Management, Nursing, Nursing leadership, and other members of the IDT team.    Consultants/Specialty  neurosurgery    Code Status  Full Code    Disposition  Medically Cleared  I have placed the appropriate orders for post-discharge needs.    Review of Systems  Review of Systems   Constitutional: Negative.  Negative for fever and malaise/fatigue.   HENT: Negative.     Eyes: Negative.    Respiratory: Negative.  Negative for cough.    Cardiovascular: Negative.  Negative for chest pain and leg swelling.   Genitourinary: Negative.    Musculoskeletal:  Positive for back pain.   Skin: Negative.    Neurological: Negative.  Negative for headaches.   Endo/Heme/Allergies: Negative.    Psychiatric/Behavioral:  The patient is nervous/anxious.    All other systems reviewed and are negative.       Physical Exam  Temp:  [36 °C (96.8 °F)-36.4 °C (97.6 °F)] 36 °C (96.8 °F)  Pulse:  [52-82] 60  Resp:  [11-40] 20  BP: ()/(52-86) 114/69  SpO2:  [91 %-96 %] 94 %    Physical Exam  Vitals and nursing note reviewed.   Constitutional:       General: He is not in acute distress.     Appearance: Normal appearance. He is not ill-appearing.   HENT:      Head: Normocephalic.      Mouth/Throat:      Mouth: Mucous membranes are moist.      Pharynx: Oropharynx is clear.   Cardiovascular:      Rate and Rhythm: Normal rate and regular rhythm.      Heart sounds: No murmur heard.  Pulmonary:       Effort: Pulmonary effort is normal. No respiratory distress.      Breath sounds: Normal breath sounds. No wheezing.   Abdominal:      General: Abdomen is flat. Bowel sounds are normal. There is no distension.      Palpations: Abdomen is soft.      Tenderness: There is no abdominal tenderness.   Musculoskeletal:         General: No swelling. Normal range of motion.      Cervical back: Normal range of motion. No tenderness.      Comments: Spinal drain in place in the L-spine area  Dressing in place  No leak   Skin:     General: Skin is warm and dry.   Neurological:      Mental Status: He is alert and oriented to person, place, and time.   Psychiatric:         Mood and Affect: Mood normal.         Behavior: Behavior normal.         Fluids    Intake/Output Summary (Last 24 hours) at 6/9/2024 1452  Last data filed at 6/9/2024 1200  Gross per 24 hour   Intake --   Output 772 ml   Net -772 ml       Laboratory  Recent Labs     06/07/24  0020 06/08/24  0211   WBC 6.4 5.9   RBC 4.34* 4.60*   HEMOGLOBIN 13.3* 14.0   HEMATOCRIT 39.2* 40.4*   MCV 90.3 87.8   MCH 30.6 30.4   MCHC 33.9 34.7   RDW 44.5 43.2   PLATELETCT 230 220   MPV 9.1 9.0     Recent Labs     06/07/24  0020 06/08/24  0211   SODIUM 141 140   POTASSIUM 4.2 4.1   CHLORIDE 108 108   CO2 23 21   GLUCOSE 99 97   BUN 16 12   CREATININE 0.70 0.67   CALCIUM 8.4* 8.6                     Imaging  MR-LUMBAR SPINE-WITH & W/O   Final Result      1.  There are postsurgical changes as evidenced by L2-3 laminectomy. CSF intense fluid collection is noted in the posterior paraspinal soft tissue adjacent to the laminectomy with extension into the posterior lateral epidural space. There is moderate    central canal stenosis at this level. Post gadolinium sequences demonstrates peripheral contrast enhancement. This finding likely represent postsurgical pseudomeningocele. The possibility of secondary infection cannot be excluded based on this finding.   2.  Degenerative changes as  described above.      IR-UNLISTED FLUORO INTERVENTIONAL PROC    (Results Pending)        Assessment/Plan  * CSF leak  Assessment & Plan  S/p L2/L3 Laminectomy 4/22/2024 with JACKI. Complicated by CSF leak x2.   MRI lumbar spine: CSF intense fluid collection posterior paraspinal soft tissue.  -Neurosurgery is following closely  - S/p IR drain placement, monitoring output  - Continue to follow neurosurgery recommendations  - Oxycodone as needed pain  - Neurosurgery recommending bedrest for 5 days after drain placement  - Titrate drain level to 5 to 15 cc/h  - Okay to clamp drain for toileting    Anxiety  Assessment & Plan  Patient has a significant amount of anxiety regarding 5 days bedbound after surgery.  - Continue Valium as needed for anxiety    Pseudomeningocele  Assessment & Plan  History of, neurosurgery following    ACP (advance care planning)- (present on admission)  Assessment & Plan  Full CODE STATUS confirmed    Hyperlipemia- (present on admission)  Assessment & Plan  Continue home atorvastatin 10mg daily     Hypertension  Assessment & Plan  SBP ranging 126-147  - Continue home lisinopril    Plan  6/9  Continue with current plan  Monitor patient's drain output  Pain control, anxiolytics,  Close monitoring  See orders  Patient is has a high medical complexity, complex decision making and is at high risk for complication, morbidity, and mortality.  Patient is down to close observation secondary to for spinal drain in place  I spent 52 minutes, reviewing the chart, obtaining and/or reviewing separately obtained history. Performing a medically appropriate examination and evaluation.  Counseling and educating the patient. Ordering and reviewing medications, tests, or procedures.   Documenting clinical information in EPIC. Independently interpreting results and communicating results to patient. Discussing future disposition of care with patient, RN and case management.      VTE prophylaxis: Lovenox okay per  neurosurgical note    I have performed a physical exam and reviewed and updated ROS and Plan today (6/9/2024). In review of yesterday's note (6/8/2024), there are no changes except as documented above.    Please note that this dictation was created using voice recognition software. I have made every reasonable attempt to correct obvious errors, but I expect that there are errors of grammar and possibly context that I did not discover before finalizing the note.

## 2024-06-09 NOTE — CARE PLAN
The patient is Watcher - Medium risk of patient condition declining or worsening    Shift Goals  Clinical Goals: lumbar drain 5-15mls/hr  Patient Goals: rest, reduced anxiety  Family Goals: updates    Progress made toward(s) clinical / shift goals:    Problem: Knowledge Deficit - Standard  Goal: Patient and family/care givers will demonstrate understanding of plan of care, disease process/condition, diagnostic tests and medications  Outcome: Progressing     Problem: Pain - Standard  Goal: Alleviation of pain or a reduction in pain to the patient’s comfort goal  Outcome: Progressing       Patient is not progressing towards the following goals:

## 2024-06-10 LAB
ANION GAP SERPL CALC-SCNC: 12 MMOL/L (ref 7–16)
BUN SERPL-MCNC: 10 MG/DL (ref 8–22)
CALCIUM SERPL-MCNC: 8.6 MG/DL (ref 8.5–10.5)
CHLORIDE SERPL-SCNC: 107 MMOL/L (ref 96–112)
CO2 SERPL-SCNC: 21 MMOL/L (ref 20–33)
CREAT SERPL-MCNC: 0.67 MG/DL (ref 0.5–1.4)
GFR SERPLBLD CREATININE-BSD FMLA CKD-EPI: 96 ML/MIN/1.73 M 2
GLUCOSE SERPL-MCNC: 102 MG/DL (ref 65–99)
POTASSIUM SERPL-SCNC: 4.1 MMOL/L (ref 3.6–5.5)
SODIUM SERPL-SCNC: 140 MMOL/L (ref 135–145)

## 2024-06-10 PROCEDURE — 700105 HCHG RX REV CODE 258: Performed by: PHYSICIAN ASSISTANT

## 2024-06-10 PROCEDURE — A9270 NON-COVERED ITEM OR SERVICE: HCPCS | Performed by: NEUROLOGICAL SURGERY

## 2024-06-10 PROCEDURE — 700111 HCHG RX REV CODE 636 W/ 250 OVERRIDE (IP): Mod: JZ | Performed by: NEUROLOGICAL SURGERY

## 2024-06-10 PROCEDURE — A9270 NON-COVERED ITEM OR SERVICE: HCPCS | Performed by: STUDENT IN AN ORGANIZED HEALTH CARE EDUCATION/TRAINING PROGRAM

## 2024-06-10 PROCEDURE — 700111 HCHG RX REV CODE 636 W/ 250 OVERRIDE (IP): Performed by: PHYSICIAN ASSISTANT

## 2024-06-10 PROCEDURE — 700102 HCHG RX REV CODE 250 W/ 637 OVERRIDE(OP): Performed by: STUDENT IN AN ORGANIZED HEALTH CARE EDUCATION/TRAINING PROGRAM

## 2024-06-10 PROCEDURE — A9270 NON-COVERED ITEM OR SERVICE: HCPCS

## 2024-06-10 PROCEDURE — A9270 NON-COVERED ITEM OR SERVICE: HCPCS | Performed by: PHYSICIAN ASSISTANT

## 2024-06-10 PROCEDURE — 99233 SBSQ HOSP IP/OBS HIGH 50: CPT | Performed by: HOSPITALIST

## 2024-06-10 PROCEDURE — 700102 HCHG RX REV CODE 250 W/ 637 OVERRIDE(OP)

## 2024-06-10 PROCEDURE — 700102 HCHG RX REV CODE 250 W/ 637 OVERRIDE(OP): Performed by: HOSPITALIST

## 2024-06-10 PROCEDURE — 770000 HCHG ROOM/CARE - INTERMEDIATE ICU *

## 2024-06-10 PROCEDURE — 700102 HCHG RX REV CODE 250 W/ 637 OVERRIDE(OP): Performed by: NEUROLOGICAL SURGERY

## 2024-06-10 PROCEDURE — 80048 BASIC METABOLIC PNL TOTAL CA: CPT

## 2024-06-10 PROCEDURE — 700102 HCHG RX REV CODE 250 W/ 637 OVERRIDE(OP): Performed by: PHYSICIAN ASSISTANT

## 2024-06-10 PROCEDURE — A9270 NON-COVERED ITEM OR SERVICE: HCPCS | Performed by: HOSPITALIST

## 2024-06-10 RX ORDER — POLYETHYLENE GLYCOL 3350 17 G/17G
1 POWDER, FOR SOLUTION ORAL
Status: DISCONTINUED | OUTPATIENT
Start: 2024-06-10 | End: 2024-06-12 | Stop reason: HOSPADM

## 2024-06-10 RX ORDER — OMEPRAZOLE 20 MG/1
20 CAPSULE, DELAYED RELEASE ORAL ONCE
Status: COMPLETED | OUTPATIENT
Start: 2024-06-10 | End: 2024-06-10

## 2024-06-10 RX ORDER — OMEPRAZOLE 20 MG/1
20 CAPSULE, DELAYED RELEASE ORAL DAILY
Status: DISCONTINUED | OUTPATIENT
Start: 2024-06-10 | End: 2024-06-12 | Stop reason: HOSPADM

## 2024-06-10 RX ORDER — AMOXICILLIN 250 MG
2 CAPSULE ORAL 2 TIMES DAILY
Status: DISCONTINUED | OUTPATIENT
Start: 2024-06-10 | End: 2024-06-12 | Stop reason: HOSPADM

## 2024-06-10 RX ORDER — CALCIUM CARBONATE 500 MG/1
500-1000 TABLET, CHEWABLE ORAL EVERY 6 HOURS PRN
Status: DISCONTINUED | OUTPATIENT
Start: 2024-06-10 | End: 2024-06-11

## 2024-06-10 RX ORDER — DIAZEPAM 2 MG/1
2 TABLET ORAL EVERY 4 HOURS PRN
Status: DISCONTINUED | OUTPATIENT
Start: 2024-06-10 | End: 2024-06-12 | Stop reason: HOSPADM

## 2024-06-10 RX ORDER — POLYETHYLENE GLYCOL 3350 17 G/17G
1 POWDER, FOR SOLUTION ORAL DAILY
Status: DISCONTINUED | OUTPATIENT
Start: 2024-06-10 | End: 2024-06-12 | Stop reason: HOSPADM

## 2024-06-10 RX ADMIN — ENOXAPARIN SODIUM 40 MG: 100 INJECTION SUBCUTANEOUS at 05:16

## 2024-06-10 RX ADMIN — SENNOSIDES AND DOCUSATE SODIUM 2 TABLET: 50; 8.6 TABLET ORAL at 17:01

## 2024-06-10 RX ADMIN — DIAZEPAM 2 MG: 2 TABLET ORAL at 05:23

## 2024-06-10 RX ADMIN — CEFAZOLIN 2 G: 2 INJECTION, POWDER, FOR SOLUTION INTRAMUSCULAR; INTRAVENOUS at 22:05

## 2024-06-10 RX ADMIN — ATORVASTATIN CALCIUM 10 MG: 10 TABLET, FILM COATED ORAL at 05:16

## 2024-06-10 RX ADMIN — BUTALBITAL, ACETAMINOPHEN AND CAFFEINE 1 TABLET: 325; 50; 40 TABLET ORAL at 11:46

## 2024-06-10 RX ADMIN — BUTALBITAL, ACETAMINOPHEN AND CAFFEINE 1 TABLET: 325; 50; 40 TABLET ORAL at 05:23

## 2024-06-10 RX ADMIN — DIAZEPAM 2 MG: 2 TABLET ORAL at 23:39

## 2024-06-10 RX ADMIN — BUTALBITAL, ACETAMINOPHEN AND CAFFEINE 1 TABLET: 325; 50; 40 TABLET ORAL at 18:06

## 2024-06-10 RX ADMIN — OMEPRAZOLE 20 MG: 20 CAPSULE, DELAYED RELEASE ORAL at 03:12

## 2024-06-10 RX ADMIN — POLYETHYLENE GLYCOL 3350 1 PACKET: 17 POWDER, FOR SOLUTION ORAL at 13:51

## 2024-06-10 RX ADMIN — ACETAMINOPHEN 1000 MG: 500 TABLET, FILM COATED ORAL at 20:18

## 2024-06-10 RX ADMIN — CEFAZOLIN 2 G: 2 INJECTION, POWDER, FOR SOLUTION INTRAMUSCULAR; INTRAVENOUS at 05:22

## 2024-06-10 RX ADMIN — DIAZEPAM 2 MG: 2 TABLET ORAL at 11:46

## 2024-06-10 RX ADMIN — ACETAMINOPHEN 1000 MG: 500 TABLET, FILM COATED ORAL at 03:21

## 2024-06-10 RX ADMIN — CEFAZOLIN 2 G: 2 INJECTION, POWDER, FOR SOLUTION INTRAMUSCULAR; INTRAVENOUS at 13:44

## 2024-06-10 RX ADMIN — LISINOPRIL 5 MG: 10 TABLET ORAL at 05:16

## 2024-06-10 RX ADMIN — OMEPRAZOLE 20 MG: 20 CAPSULE, DELAYED RELEASE ORAL at 13:40

## 2024-06-10 RX ADMIN — DIAZEPAM 2 MG: 2 TABLET ORAL at 18:06

## 2024-06-10 ASSESSMENT — ENCOUNTER SYMPTOMS
NEUROLOGICAL NEGATIVE: 1
BACK PAIN: 1
NERVOUS/ANXIOUS: 1
CONSTITUTIONAL NEGATIVE: 1
RESPIRATORY NEGATIVE: 1
HEADACHES: 0
COUGH: 0
HEARTBURN: 1
EYES NEGATIVE: 1
CARDIOVASCULAR NEGATIVE: 1
CONSTIPATION: 1
FEVER: 0

## 2024-06-10 ASSESSMENT — PAIN DESCRIPTION - PAIN TYPE
TYPE: ACUTE PAIN

## 2024-06-10 ASSESSMENT — FIBROSIS 4 INDEX
FIB4 SCORE: 0.86

## 2024-06-10 NOTE — CARE PLAN
The patient is Watcher - Medium risk of patient condition declining or worsening    Shift Goals  Clinical Goals: lumbar drain 5-15ml/hr  Patient Goals: comfort, reduce anxiety  Family Goals: updates    Progress made toward(s) clinical / shift goals:    Problem: Knowledge Deficit - Standard  Goal: Patient and family/care givers will demonstrate understanding of plan of care, disease process/condition, diagnostic tests and medications  Outcome: Progressing     Problem: Pain - Standard  Goal: Alleviation of pain or a reduction in pain to the patient’s comfort goal  Outcome: Progressing       Patient is not progressing towards the following goals:N/A

## 2024-06-10 NOTE — CARE PLAN
The patient is Watcher - Medium risk of patient condition declining or worsening    Shift Goals  Clinical Goals: lumbar drain 5-15ml/hr  Patient Goals: rest, reduced anxiety  Family Goals: updates    Progress made toward(s) clinical / shift goals:        Problem: Knowledge Deficit - Standard  Goal: Patient and family/care givers will demonstrate understanding of plan of care, disease process/condition, diagnostic tests and medications  Outcome: Progressing     Problem: Pain - Standard  Goal: Alleviation of pain or a reduction in pain to the patient’s comfort goal  Outcome: Progressing

## 2024-06-10 NOTE — PROGRESS NOTES
Hospital Medicine Daily Progress Note    Date of Service  6/10/2024    Chief Complaint  Alec Cruz is a 79 y.o. 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  Admitted 6/5 for swelling at surgical site and CSF leak.  MRI lumbar spine:  There are postsurgical changes as evidenced by L2-3 laminectomy. CSF intense fluid collection is noted in the posterior paraspinal soft tissue adjacent to the laminectomy with extension into the posterior lateral epidural space.  Neurosurgery was consulted from the emergency room.   The patient subsequently underwent a lumbar drain placement by interventional radiology at L4-5 with the tip of the catheter at L1.    Interval Problem Update  Patient seen and examined today.  Data, Medication data reviewed.  Case discussed with nursing as available.  Plan of Care reviewed with patient and notified of changes.  6/6: Vital stable overnight.  MRI lumbar spine completed.  WBC stable at 8.1.  Hb stable at 16.  Creatinine 0.66.  6/7: Vital stable overnight.  Hb 13.3 from 16.2.  Unfortunately, due to scheduling constraints with interventional radiology yesterday patient's procedure was delayed until this morning.  Patient is currently n.p.o. for neurosurgical intervention today.  6/8/2024 the patient is laying on his side, he is fairly comfortable, he does have anxiety which he treats with Valium successfully, the drain is still draining significant amount of CSF, the patient evaluated this morning by neurosurgery, output approximately 5 to 15 cc/h, the patient to have his drain removed possibly Wednesday.  The patient remains in IMCU for close monitoring with a spinal drain in place  The patient is afebrile, heart rate in the 50s and 70s, respiration unlabored,  blood pressure in the 90s to 100s over 60s, laboratory data evaluated, CBC fairly unchanged, platelet count 220, chemistry unchanged,  Discussed current plan with the patient with wife at the bedside  6/9 the patient with some back discomfort, otherwise currently tolerating the CSF drainage, mild headache  Currently no other changes, his back situation is evaluated by spine surgery  The patient denies nausea vomiting, no visual changes  No fevers chills  6/10 the patient without increase in pain, the dressing is dry, the output is being monitored and adequate, remains with a spinal drain in place.  Precautions in place.  The patient is asking for GI remedies for bowel control and heartburn  Laboratory data reviewed  Hemodynamically stable      I have discussed this patient's plan of care and discharge plan at IDT rounds today with Case Management, Nursing, Nursing leadership, and other members of the IDT team.    Consultants/Specialty  neurosurgery    Code Status  Full Code    Disposition  The patient is not medically cleared for discharge to home or a post-acute facility.  Anticipate discharge to: home with close outpatient follow-up    I have placed the appropriate orders for post-discharge needs.    Review of Systems  Review of Systems   Constitutional: Negative.  Negative for fever and malaise/fatigue.   HENT: Negative.     Eyes: Negative.    Respiratory: Negative.  Negative for cough.    Cardiovascular: Negative.  Negative for chest pain and leg swelling.   Gastrointestinal:  Positive for constipation and heartburn.   Genitourinary: Negative.    Musculoskeletal:  Positive for back pain.   Skin: Negative.    Neurological: Negative.  Negative for headaches.   Endo/Heme/Allergies: Negative.    Psychiatric/Behavioral:  The patient is nervous/anxious.    All other systems reviewed and are negative.       Physical Exam  Temp:  [36.1 °C (97 °F)] 36.1 °C (97 °F)  Pulse:  [46-68] 50  Resp:  [8-35] 10  BP:  ()/(51-77) 104/61  SpO2:  [92 %-97 %] 92 %    Physical Exam  Vitals and nursing note reviewed.   Constitutional:       General: He is not in acute distress.     Appearance: Normal appearance. He is not ill-appearing.   HENT:      Head: Normocephalic.      Mouth/Throat:      Mouth: Mucous membranes are moist.      Pharynx: Oropharynx is clear.   Cardiovascular:      Rate and Rhythm: Normal rate and regular rhythm.      Heart sounds: No murmur heard.  Pulmonary:      Effort: Pulmonary effort is normal. No respiratory distress.      Breath sounds: Normal breath sounds. No wheezing.   Abdominal:      General: Abdomen is flat. Bowel sounds are normal. There is no distension.      Palpations: Abdomen is soft.      Tenderness: There is no abdominal tenderness.   Musculoskeletal:         General: No swelling. Normal range of motion.      Cervical back: Normal range of motion. No tenderness.      Comments: Spinal drain in place in the L-spine area  Dressing in place  No leak   Skin:     General: Skin is warm and dry.   Neurological:      Mental Status: He is alert and oriented to person, place, and time.   Psychiatric:         Mood and Affect: Mood normal.         Behavior: Behavior normal.         Fluids    Intake/Output Summary (Last 24 hours) at 6/10/2024 1328  Last data filed at 6/10/2024 1300  Gross per 24 hour   Intake 1219.02 ml   Output 627 ml   Net 592.02 ml       Laboratory  Recent Labs     06/08/24  0211   WBC 5.9   RBC 4.60*   HEMOGLOBIN 14.0   HEMATOCRIT 40.4*   MCV 87.8   MCH 30.4   MCHC 34.7   RDW 43.2   PLATELETCT 220   MPV 9.0     Recent Labs     06/08/24  0211 06/10/24  0318   SODIUM 140 140   POTASSIUM 4.1 4.1   CHLORIDE 108 107   CO2 21 21   GLUCOSE 97 102*   BUN 12 10   CREATININE 0.67 0.67   CALCIUM 8.6 8.6                     Imaging  MR-LUMBAR SPINE-WITH & W/O   Final Result      1.  There are postsurgical changes as evidenced by L2-3 laminectomy. CSF intense fluid collection is noted in the  posterior paraspinal soft tissue adjacent to the laminectomy with extension into the posterior lateral epidural space. There is moderate    central canal stenosis at this level. Post gadolinium sequences demonstrates peripheral contrast enhancement. This finding likely represent postsurgical pseudomeningocele. The possibility of secondary infection cannot be excluded based on this finding.   2.  Degenerative changes as described above.      IR-UNLISTED FLUORO INTERVENTIONAL PROC    (Results Pending)        Assessment/Plan  * CSF leak  Assessment & Plan  S/p L2/L3 Laminectomy 4/22/2024 with JACKI. Complicated by CSF leak x2.   MRI lumbar spine: CSF intense fluid collection posterior paraspinal soft tissue.  -Neurosurgery is following closely  - S/p IR drain placement, monitoring output  - Continue to follow neurosurgery recommendations  - Oxycodone as needed pain  - Neurosurgery recommending bedrest for 5 days after drain placement  - Titrate drain level to 5 to 15 cc/h  - Okay to clamp drain for toileting    Anxiety  Assessment & Plan  Patient has a significant amount of anxiety regarding 5 days bedbound after surgery.  - Continue Valium as needed for anxiety    Pseudomeningocele  Assessment & Plan  History of, neurosurgery following    ACP (advance care planning)- (present on admission)  Assessment & Plan  Full CODE STATUS confirmed    Hyperlipemia- (present on admission)  Assessment & Plan  Continue home atorvastatin 10mg daily     Hypertension  Assessment & Plan  SBP ranging 126-147  - Continue home lisinopril    Plan  6/10  GI remedies, bowel regimen ordered  Continue with current plan  Monitor patient's drain output, so far adequate and sufficient  Pain control, anxiolytics,  Close monitoring  Plan for removal of the drain on 6/12 as per neurosurgery  See orders  Patient is has a high medical complexity, complex decision making and is at high risk for complication, morbidity, and mortality.  Patient is down to  close observation secondary to for spinal drain in place  I spent 51 minutes, reviewing the chart, obtaining and/or reviewing separately obtained history. Performing a medically appropriate examination and evaluation.  Counseling and educating the patient. Ordering and reviewing medications, tests, or procedures.   Documenting clinical information in EPIC. Independently interpreting results and communicating results to patient. Discussing future disposition of care with patient, RN and case management.      VTE prophylaxis: Lovenox okay per neurosurgical note, might need to be held/stopped prior to removal of the catheter    I have performed a physical exam and reviewed and updated ROS and Plan today (6/10/2024). In review of yesterday's note (6/9/2024), there are no changes except as documented above.    Please note that this dictation was created using voice recognition software. I have made every reasonable attempt to correct obvious errors, but I expect that there are errors of grammar and possibly context that I did not discover before finalizing the note.

## 2024-06-10 NOTE — PROGRESS NOTES
Neurosurgery Progress Note    Subjective:  Lumbar drain placed  2024  No changes overnight  He has some increasing anxiety  -taking valium  Lovenox started   He looks good  Mild HA  No leg symptoms      Exam:  Moto5 5/5 both LE  GCS 15  Wound- dried blood on bottom. Incision looks flat. No drainage  Lumbar drain has been 8-15cc per hour today    BP  Min: 91/51  Max: 131/70  Pulse  Av.6  Min: 46  Max: 68  Resp  Av.5  Min: 8  Max: 35  Temp  Av.1 °C (97 °F)  Min: 36.1 °C (97 °F)  Max: 36.1 °C (97 °F)  SpO2  Av.4 %  Min: 92 %  Max: 97 %    No data recorded    Recent Labs     24   WBC 5.9   RBC 4.60*   HEMOGLOBIN 14.0   HEMATOCRIT 40.4*   MCV 87.8   MCH 30.4   MCHC 34.7   RDW 43.2   PLATELETCT 220   MPV 9.0     Recent Labs     24  0211 06/10/24  0318   SODIUM 140 140   POTASSIUM 4.1 4.1   CHLORIDE 108 107   CO2 21 21   GLUCOSE 97 102*   BUN 12 10   CREATININE 0.67 0.67   CALCIUM 8.6 8.6                   Intake/Output                         24 0700 - 06/10/24 0659 06/10/24 07 - 24 0659     5058-1926 5483-6313 Total 1397-8193 5746-7909 Total                 Intake    P.O.  480  -- 480  --  -- --    P.O. 480 -- 480 -- -- --    IV Piggyback  979  -- 979  --  -- --    Volume (mL) (ceFAZolin (Ancef) 2 g in  mL IVPB) 979 -- 979 -- -- --    Total Intake 1459 -- 1459 -- -- --       Output    Urine  640  -- 640  --  -- --    Urine Void (mL) 240 -- 240 -- -- --    Output (mL) (External Urinary Catheter (Condom)) 400 -- 400 -- -- --    Cerebrospinal Fluid  133  104 237  79  -- 79    CSF Output (mL) (CSF Drain Group Lumbar) 133 104 237 79 -- 79    Total Output 773 104 877 79 -- 79       Net I/O     686 -104 582 -79 -- -79              Intake/Output Summary (Last 24 hours) at 6/10/2024 1414  Last data filed at 6/10/2024 1400  Gross per 24 hour   Intake --   Output 632 ml   Net -632 ml             omeprazole  20 mg DAILY    calcium carbonate  500-1,000 mg Q6HRS PRN     senna-docusate  2 Tablet BID    And    polyethylene glycol/lytes  1 Packet QDAY PRN    polyethylene glycol/lytes  1 Packet DAILY    ceFAZolin  2 g Q8HRS    oxyCODONE immediate-release  5 mg Q6HRS PRN    lisinopril  5 mg DAILY    atorvastatin  10 mg MO, WE + FR    acetaminophen  1,000 mg PRN    diazePAM  2 mg Q6HRS PRN    butalbital/apap/caffeine  1 Tablet Q6HRS PRN       Assessment and Plan:  Hospital day # 6  Lumbar drain day #4    Continue lumbar drain    Plan  Pressure dressing on upper wound  Continue Lovenox today and tomorrow (last dose lovenox 6/11)  Continue drainage until WED am

## 2024-06-11 PROCEDURE — 700102 HCHG RX REV CODE 250 W/ 637 OVERRIDE(OP): Performed by: HOSPITALIST

## 2024-06-11 PROCEDURE — 99232 SBSQ HOSP IP/OBS MODERATE 35: CPT | Performed by: HOSPITALIST

## 2024-06-11 PROCEDURE — 700102 HCHG RX REV CODE 250 W/ 637 OVERRIDE(OP): Performed by: PHYSICIAN ASSISTANT

## 2024-06-11 PROCEDURE — 700105 HCHG RX REV CODE 258: Performed by: PHYSICIAN ASSISTANT

## 2024-06-11 PROCEDURE — 700111 HCHG RX REV CODE 636 W/ 250 OVERRIDE (IP): Performed by: PHYSICIAN ASSISTANT

## 2024-06-11 PROCEDURE — 700102 HCHG RX REV CODE 250 W/ 637 OVERRIDE(OP)

## 2024-06-11 PROCEDURE — A9270 NON-COVERED ITEM OR SERVICE: HCPCS | Performed by: HOSPITALIST

## 2024-06-11 PROCEDURE — A9270 NON-COVERED ITEM OR SERVICE: HCPCS | Performed by: STUDENT IN AN ORGANIZED HEALTH CARE EDUCATION/TRAINING PROGRAM

## 2024-06-11 PROCEDURE — A9270 NON-COVERED ITEM OR SERVICE: HCPCS | Performed by: PHYSICIAN ASSISTANT

## 2024-06-11 PROCEDURE — A9270 NON-COVERED ITEM OR SERVICE: HCPCS

## 2024-06-11 PROCEDURE — 770000 HCHG ROOM/CARE - INTERMEDIATE ICU *

## 2024-06-11 PROCEDURE — 700102 HCHG RX REV CODE 250 W/ 637 OVERRIDE(OP): Performed by: STUDENT IN AN ORGANIZED HEALTH CARE EDUCATION/TRAINING PROGRAM

## 2024-06-11 RX ORDER — ENOXAPARIN SODIUM 100 MG/ML
40 INJECTION SUBCUTANEOUS ONCE
Status: COMPLETED | OUTPATIENT
Start: 2024-06-11 | End: 2024-06-11

## 2024-06-11 RX ORDER — CALCIUM CARBONATE 500 MG/1
500-1000 TABLET, CHEWABLE ORAL EVERY 6 HOURS PRN
Status: DISCONTINUED | OUTPATIENT
Start: 2024-06-11 | End: 2024-06-12 | Stop reason: HOSPADM

## 2024-06-11 RX ADMIN — ENOXAPARIN SODIUM 40 MG: 100 INJECTION SUBCUTANEOUS at 09:37

## 2024-06-11 RX ADMIN — OMEPRAZOLE 20 MG: 20 CAPSULE, DELAYED RELEASE ORAL at 05:08

## 2024-06-11 RX ADMIN — ACETAMINOPHEN 1000 MG: 500 TABLET, FILM COATED ORAL at 13:24

## 2024-06-11 RX ADMIN — SENNOSIDES AND DOCUSATE SODIUM 2 TABLET: 50; 8.6 TABLET ORAL at 05:08

## 2024-06-11 RX ADMIN — ACETAMINOPHEN 1000 MG: 500 TABLET, FILM COATED ORAL at 03:54

## 2024-06-11 RX ADMIN — LISINOPRIL 5 MG: 10 TABLET ORAL at 05:08

## 2024-06-11 RX ADMIN — CEFAZOLIN 2 G: 2 INJECTION, POWDER, FOR SOLUTION INTRAMUSCULAR; INTRAVENOUS at 13:32

## 2024-06-11 RX ADMIN — DIAZEPAM 2 MG: 2 TABLET ORAL at 09:47

## 2024-06-11 RX ADMIN — DIAZEPAM 2 MG: 2 TABLET ORAL at 14:23

## 2024-06-11 RX ADMIN — SENNOSIDES AND DOCUSATE SODIUM 2 TABLET: 50; 8.6 TABLET ORAL at 18:11

## 2024-06-11 RX ADMIN — ACETAMINOPHEN 1000 MG: 500 TABLET, FILM COATED ORAL at 23:04

## 2024-06-11 RX ADMIN — POLYETHYLENE GLYCOL 3350 1 PACKET: 17 POWDER, FOR SOLUTION ORAL at 05:08

## 2024-06-11 RX ADMIN — DIAZEPAM 2 MG: 2 TABLET ORAL at 03:54

## 2024-06-11 RX ADMIN — DIAZEPAM 2 MG: 2 TABLET ORAL at 23:04

## 2024-06-11 RX ADMIN — CEFAZOLIN 2 G: 2 INJECTION, POWDER, FOR SOLUTION INTRAMUSCULAR; INTRAVENOUS at 05:09

## 2024-06-11 RX ADMIN — BUTALBITAL, ACETAMINOPHEN AND CAFFEINE 1 TABLET: 325; 50; 40 TABLET ORAL at 03:59

## 2024-06-11 RX ADMIN — DIAZEPAM 2 MG: 2 TABLET ORAL at 18:56

## 2024-06-11 RX ADMIN — CEFAZOLIN 2 G: 2 INJECTION, POWDER, FOR SOLUTION INTRAMUSCULAR; INTRAVENOUS at 21:12

## 2024-06-11 ASSESSMENT — ENCOUNTER SYMPTOMS
VOMITING: 0
COUGH: 0
ABDOMINAL PAIN: 0
FEVER: 0
PALPITATIONS: 0
CONSTIPATION: 1
LOSS OF CONSCIOUSNESS: 0
CHILLS: 0
HEADACHES: 0
NAUSEA: 0
SHORTNESS OF BREATH: 0
DIZZINESS: 0
DIARRHEA: 0
BACK PAIN: 1

## 2024-06-11 ASSESSMENT — PAIN DESCRIPTION - PAIN TYPE
TYPE: ACUTE PAIN

## 2024-06-11 ASSESSMENT — FIBROSIS 4 INDEX: FIB4 SCORE: 0.86

## 2024-06-11 NOTE — CARE PLAN
The patient is Stable - Low risk of patient condition declining or worsening    Shift Goals  Clinical Goals: stable neurochecks, monitor lumbar drain 5-15ml/hr  Patient Goals: move  Family Goals: Updates    Progress made toward(s) clinical / shift goals:    Problem: Pain - Standard  Goal: Alleviation of pain or a reduction in pain to the patient’s comfort goal  Outcome: Progressing     Problem: Hemodynamics  Goal: Patient's hemodynamics, fluid balance and neurologic status will be stable or improve  Outcome: Progressing     Problem: Lumbar/Thoracic Spine Surgery  Goal: Post-Operative Lumbar/Thoracic Spine Surgery: Patient will achieve optimal post-surgical outcomes  Outcome: Progressing     Problem: Neuro Status  Goal: Neuro status will remain stable or improve  Outcome: Progressing     Problem: Lumbar Drain Management  Goal: Proper management/care of lumbar drain will be maintained  Outcome: Progressing

## 2024-06-11 NOTE — PROGRESS NOTES
4 Eyes Skin Assessment Completed by BALA Frias and BALA Rodney.    Head WDL  Ears WDL  Nose WDL  Mouth WDL  Neck WDL  Breast/Chest WDL  Shoulder Blades WDL  Spine scar, redness, incision with lumbar drain, dressing in place   (R) Arm/Elbow/Hand WDL  (L) Arm/Elbow/Hand WDL  Abdomen WDL  Groin WDL  Scrotum/Coccyx/Buttocks WDL  (R) Leg WDL  (L) Leg WDL  (R) Heel/Foot/Toe WDL  (L) Heel/Foot/Toe WDL          Devices In Places ECG, Blood Pressure Cuff, Pulse Ox, and SCD's      Interventions In Place Pillows, Low Air Loss Mattress, Heels Loaded W/Pillows, and Pressure Redistribution Mattress    Possible Skin Injury No    Pictures Uploaded Into Epic N/A  Wound Consult Placed N/A  RN Wound Prevention Protocol Ordered N/A

## 2024-06-11 NOTE — PROGRESS NOTES
Levar Cazares PA-C with neurosurgery notified that there was a scant amount of blood in pts lumbar drain line, picture included. Per PA-C as long as the drain continues functioning at 5-15cc per hour it is ok.

## 2024-06-11 NOTE — PROGRESS NOTES
.4 Eyes Skin Assessment Completed by BALA Day and BALA Rodney.    Head WDL  Ears WDL  Nose WDL  Mouth WDL  Neck WDL  Breast/Chest WDL  Shoulder Blades WDL  Spine Redness and Incision-lumbar drain and dressing in place, C/D/I  (R) Arm/Elbow/Hand WDL  (L) Arm/Elbow/Hand WDL  Abdomen WDL  Groin WDL  Scrotum/Coccyx/Buttocks WDL  (R) Leg WDL  (L) Leg WDL  (R) Heel/Foot/Toe WDL  (L) Heel/Foot/Toe WDL          Devices In Places ECG, Blood Pressure Cuff, Pulse Ox, and SCD's      Interventions In Place TAP System, Low Air Loss Mattress, Heels Loaded W/Pillows, and Pressure Redistribution Mattress    Possible Skin Injury No    Pictures Uploaded Into Epic N/A  Wound Consult Placed N/A  RN Wound Prevention Protocol Ordered N/A

## 2024-06-11 NOTE — PROGRESS NOTES
Hospital Medicine Daily Progress Note    Date of Service  6/11/2024    Chief Complaint  Alec Cruz is a 79 y.o. male admitted 6/5/2024 with CSF leak    Hospital Course  78yo PMHx severe L spine central stenosis s/p L3-4 laminectomy complicated by CSF leak and pseudomeningocele s/p attempted surgical repair x 2.  Re-admitted with same issue and CSF drain placed 6/7      Interval Problem Update  Pt has little back pain which he primarily attributes to being stuck in bed.  No numbness or tingling in his legs, has not had a BM in several days but doesn't feel he has to.  Did get up to walk to the bathroom yesterday, a little dizzy but no weakness    Treatment plan discussed with the patient, as well as his wife who is at the bedside at length.  All questions answered.    Sinus 50s  -120  On RA  AFebrile  CSF output 220ml/24hrs    I have discussed this patient's plan of care and discharge plan at IDT rounds today with Case Management, Nursing, Nursing leadership, and other members of the IDT team.    Consultants/Specialty  neurosurgery    Code Status  Full Code    Disposition  The patient is not medically cleared for discharge to home or a post-acute facility.      I have placed the appropriate orders for post-discharge needs.    Review of Systems  Review of Systems   Constitutional:  Negative for chills and fever.   Respiratory:  Negative for cough and shortness of breath.    Cardiovascular:  Negative for chest pain, palpitations and leg swelling.   Gastrointestinal:  Positive for constipation. Negative for abdominal pain, diarrhea, nausea and vomiting.   Genitourinary:  Negative for dysuria and urgency.   Musculoskeletal:  Positive for back pain.   Skin:  Negative for rash.   Neurological:  Negative for dizziness, loss of consciousness and headaches.        Physical Exam  Temp:  [36.8 °C (98.3 °F)-36.9 °C (98.5 °F)] 36.8 °C (98.3 °F)  Pulse:  [50-60] 50  Resp:  [10-20] 12  BP: (104-155)/(55-82)  124/68  SpO2:  [92 %-97 %] 95 %    Physical Exam  Constitutional:       General: He is not in acute distress.     Appearance: He is well-developed. He is not diaphoretic.   HENT:      Head: Normocephalic and atraumatic.   Eyes:      Conjunctiva/sclera: Conjunctivae normal.   Neck:      Vascular: No JVD.   Cardiovascular:      Rate and Rhythm: Normal rate.      Heart sounds: No murmur heard.     No gallop.   Pulmonary:      Effort: Pulmonary effort is normal. No respiratory distress.      Breath sounds: No stridor. No wheezing or rales.   Abdominal:      Palpations: Abdomen is soft.      Tenderness: There is no abdominal tenderness. There is no guarding or rebound.   Musculoskeletal:      Right lower leg: No edema.      Left lower leg: No edema.   Skin:     General: Skin is warm and dry.      Findings: No rash.   Neurological:      Mental Status: He is oriented to person, place, and time.   Psychiatric:         Thought Content: Thought content normal.         Fluids    Intake/Output Summary (Last 24 hours) at 6/11/2024 0651  Last data filed at 6/11/2024 0600  Gross per 24 hour   Intake --   Output 220 ml   Net -220 ml       Laboratory      Recent Labs     06/10/24  0318   SODIUM 140   POTASSIUM 4.1   CHLORIDE 107   CO2 21   GLUCOSE 102*   BUN 10   CREATININE 0.67   CALCIUM 8.6                   Imaging  IR-UNLISTED FLUORO INTERVENTIONAL PROC   Final Result         Lumbar drain placement through L4-L5 subarachnoid space access with the tip of the catheter positioned at the L1 level.      MR-LUMBAR SPINE-WITH & W/O   Final Result      1.  There are postsurgical changes as evidenced by L2-3 laminectomy. CSF intense fluid collection is noted in the posterior paraspinal soft tissue adjacent to the laminectomy with extension into the posterior lateral epidural space. There is moderate    central canal stenosis at this level. Post gadolinium sequences demonstrates peripheral contrast enhancement. This finding likely  represent postsurgical pseudomeningocele. The possibility of secondary infection cannot be excluded based on this finding.   2.  Degenerative changes as described above.           Assessment/Plan  * CSF leak  Assessment & Plan  S/p L2/L3 Laminectomy 4/22/2024 with JACKI. Complicated by CSF leak x2.   MRI lumbar spine: CSF intense fluid collection posterior paraspinal soft tissue.  Neurosurgery following  IR CSF drain placed June 7  Plan to complete 5 days of bedrest tomorrow at which point may trial with drain out  Preoperative back pain is essentially resolved as are his radicular complaints.  Titrate drain level to 5 to 15 cc/h  Okay to clamp drain for toileting    Discussed with neurosurgery today    Anxiety  Assessment & Plan  Patient has a significant amount of anxiety regarding 5 days bedbound after surgery.  - Continue Valium as needed for anxiety    Pseudomeningocele  Assessment & Plan  History of, neurosurgery following    ACP (advance care planning)- (present on admission)  Assessment & Plan  Full CODE STATUS confirmed    Hyperlipemia- (present on admission)  Assessment & Plan  Continue home atorvastatin 10mg daily     Hypertension  Assessment & Plan  SBP ranging 126-147  - Continue home lisinopril         VTE prophylaxis: VTE Selection    I have performed a physical exam and reviewed and updated ROS and Plan today (6/11/2024). In review of yesterday's note (6/10/2024), there are no changes except as documented above.

## 2024-06-11 NOTE — CARE PLAN
The patient is Watcher - Medium risk of patient condition declining or worsening    Shift Goals  Clinical Goals: lumbar drain working properly 5-15cc/hr output  Patient Goals: get drain out and go home  Family Goals: updates    Progress made toward(s) clinical / shift goals:    Problem: Knowledge Deficit - Standard  Goal: Patient and family/care givers will demonstrate understanding of plan of care, disease process/condition, diagnostic tests and medications  Outcome: Progressing     Problem: Pain - Standard  Goal: Alleviation of pain or a reduction in pain to the patient’s comfort goal  Outcome: Progressing  Note: Pt assessed for pain regularly and medicated PRN per MAR.      Problem: Hemodynamics  Goal: Patient's hemodynamics, fluid balance and neurologic status will be stable or improve  Outcome: Progressing     Problem: Neuro Status  Goal: Neuro status will remain stable or improve  Outcome: Progressing     Problem: Lumbar Drain Management  Goal: Proper management/care of lumbar drain will be maintained  Outcome: Progressing

## 2024-06-11 NOTE — PROGRESS NOTES
Neurosurgery Progress Note    Subjective:  Lumbar drain placed  2024  Drain stopped working overnight  RN noticed drainage about tubing overnight  No changes otherwise      Exam:  Motor 5/5 both LE  GCS 15  Wound- dry  Lumbar drain site: Dry      BP  Min: 104/61  Max: 155/82  Pulse  Av.7  Min: 50  Max: 59  Resp  Avg: 15.8  Min: 12  Max: 20  Temp  Av.9 °C (98.4 °F)  Min: 36.8 °C (98.3 °F)  Max: 36.9 °C (98.5 °F)  SpO2  Av.6 %  Min: 92 %  Max: 97 %    No data recorded          Recent Labs     06/10/24  0318   SODIUM 140   POTASSIUM 4.1   CHLORIDE 107   CO2 21   GLUCOSE 102*   BUN 10   CREATININE 0.67   CALCIUM 8.6                   Intake/Output                         06/10/24 0700 - 24 0659 24 0700 - 24 0659     6588-2182 5827-8404 Total 2449-4145 0881-9319 Total                 Intake    Total Intake -- -- -- -- -- --       Output    Cerebrospinal Fluid  121  99 220  0  -- 0    CSF Output (mL) (CSF Drain Group Lumbar) 121 99 220 0 -- 0    Total Output 121 99 220 0 -- 0       Net I/O     -121 -99 -220 0 -- 0              Intake/Output Summary (Last 24 hours) at 2024 0901  Last data filed at 2024 0700  Gross per 24 hour   Intake --   Output 196 ml   Net -196 ml             omeprazole  20 mg DAILY    calcium carbonate  500-1,000 mg Q6HRS PRN    senna-docusate  2 Tablet BID    And    polyethylene glycol/lytes  1 Packet QDAY PRN    polyethylene glycol/lytes  1 Packet DAILY    diazePAM  2 mg Q4HRS PRN    ceFAZolin  2 g Q8HRS    oxyCODONE immediate-release  5 mg Q6HRS PRN    lisinopril  5 mg DAILY    atorvastatin  10 mg MO, WE + FR    acetaminophen  1,000 mg PRN    butalbital/apap/caffeine  1 Tablet Q6HRS PRN       Assessment and Plan:  Hospital day # 7  Lumbar drain day #5    Lumbar drain reconnected using bedside sterile technique  Lumbar drain working at this time  RN to titrate drain height to 5-15cc per hour    Continue lumbar drain another 24 hours    Plan  Pressure  dressing applied to wound today  Continue Lovenox today  Continue drainage until WED am

## 2024-06-12 ENCOUNTER — PHARMACY VISIT (OUTPATIENT)
Dept: PHARMACY | Facility: MEDICAL CENTER | Age: 79
End: 2024-06-12
Payer: COMMERCIAL

## 2024-06-12 VITALS
DIASTOLIC BLOOD PRESSURE: 77 MMHG | WEIGHT: 175.49 LBS | SYSTOLIC BLOOD PRESSURE: 135 MMHG | RESPIRATION RATE: 15 BRPM | HEART RATE: 92 BPM | BODY MASS INDEX: 25.12 KG/M2 | OXYGEN SATURATION: 95 % | TEMPERATURE: 98 F | HEIGHT: 70 IN

## 2024-06-12 DIAGNOSIS — R51.9 NONINTRACTABLE HEADACHE, UNSPECIFIED CHRONICITY PATTERN, UNSPECIFIED HEADACHE TYPE: ICD-10-CM

## 2024-06-12 PROBLEM — G96.198 PSEUDOMENINGOCELE: Status: RESOLVED | Noted: 2024-06-05 | Resolved: 2024-06-12

## 2024-06-12 PROBLEM — F41.9 ANXIETY: Status: RESOLVED | Noted: 2024-06-07 | Resolved: 2024-06-12

## 2024-06-12 PROBLEM — Z71.89 ACP (ADVANCE CARE PLANNING): Status: RESOLVED | Noted: 2024-05-07 | Resolved: 2024-06-12

## 2024-06-12 PROBLEM — G96.00 CSF LEAK: Status: RESOLVED | Noted: 2024-05-21 | Resolved: 2024-06-12

## 2024-06-12 PROCEDURE — 700111 HCHG RX REV CODE 636 W/ 250 OVERRIDE (IP): Performed by: PHYSICIAN ASSISTANT

## 2024-06-12 PROCEDURE — 700105 HCHG RX REV CODE 258: Performed by: PHYSICIAN ASSISTANT

## 2024-06-12 PROCEDURE — RXMED WILLOW AMBULATORY MEDICATION CHARGE: Performed by: PHYSICIAN ASSISTANT

## 2024-06-12 PROCEDURE — A9270 NON-COVERED ITEM OR SERVICE: HCPCS

## 2024-06-12 PROCEDURE — A9270 NON-COVERED ITEM OR SERVICE: HCPCS | Performed by: PHYSICIAN ASSISTANT

## 2024-06-12 PROCEDURE — A9270 NON-COVERED ITEM OR SERVICE: HCPCS | Performed by: HOSPITALIST

## 2024-06-12 PROCEDURE — 700102 HCHG RX REV CODE 250 W/ 637 OVERRIDE(OP): Performed by: HOSPITALIST

## 2024-06-12 PROCEDURE — 700102 HCHG RX REV CODE 250 W/ 637 OVERRIDE(OP): Performed by: STUDENT IN AN ORGANIZED HEALTH CARE EDUCATION/TRAINING PROGRAM

## 2024-06-12 PROCEDURE — 99239 HOSP IP/OBS DSCHRG MGMT >30: CPT | Performed by: HOSPITALIST

## 2024-06-12 PROCEDURE — A9270 NON-COVERED ITEM OR SERVICE: HCPCS | Performed by: STUDENT IN AN ORGANIZED HEALTH CARE EDUCATION/TRAINING PROGRAM

## 2024-06-12 PROCEDURE — 700102 HCHG RX REV CODE 250 W/ 637 OVERRIDE(OP)

## 2024-06-12 PROCEDURE — 700101 HCHG RX REV CODE 250: Performed by: PHYSICIAN ASSISTANT

## 2024-06-12 PROCEDURE — 700102 HCHG RX REV CODE 250 W/ 637 OVERRIDE(OP): Performed by: PHYSICIAN ASSISTANT

## 2024-06-12 RX ORDER — BUTALBITAL, ACETAMINOPHEN AND CAFFEINE 50; 325; 40 MG/1; MG/1; MG/1
1 TABLET ORAL EVERY 6 HOURS PRN
Qty: 20 TABLET | Refills: 0 | Status: SHIPPED | OUTPATIENT
Start: 2024-06-12 | End: 2024-06-17

## 2024-06-12 RX ADMIN — OMEPRAZOLE 20 MG: 20 CAPSULE, DELAYED RELEASE ORAL at 05:16

## 2024-06-12 RX ADMIN — DIAZEPAM 2 MG: 2 TABLET ORAL at 03:14

## 2024-06-12 RX ADMIN — BUTALBITAL, ACETAMINOPHEN AND CAFFEINE 1 TABLET: 325; 50; 40 TABLET ORAL at 03:14

## 2024-06-12 RX ADMIN — SENNOSIDES AND DOCUSATE SODIUM 2 TABLET: 50; 8.6 TABLET ORAL at 05:16

## 2024-06-12 RX ADMIN — DIAZEPAM 2 MG: 2 TABLET ORAL at 07:29

## 2024-06-12 RX ADMIN — ACETAMINOPHEN 1000 MG: 500 TABLET, FILM COATED ORAL at 05:17

## 2024-06-12 RX ADMIN — ACETAMINOPHEN 1000 MG: 500 TABLET, FILM COATED ORAL at 14:02

## 2024-06-12 RX ADMIN — POLYETHYLENE GLYCOL 3350 1 PACKET: 17 POWDER, FOR SOLUTION ORAL at 05:16

## 2024-06-12 RX ADMIN — ATORVASTATIN CALCIUM 10 MG: 10 TABLET, FILM COATED ORAL at 05:17

## 2024-06-12 RX ADMIN — LIDOCAINE HYDROCHLORIDE 3 ML: 10 INJECTION, SOLUTION INFILTRATION; PERINEURAL at 08:44

## 2024-06-12 RX ADMIN — CEFAZOLIN 2 G: 2 INJECTION, POWDER, FOR SOLUTION INTRAMUSCULAR; INTRAVENOUS at 05:22

## 2024-06-12 ASSESSMENT — PAIN DESCRIPTION - PAIN TYPE
TYPE: ACUTE PAIN

## 2024-06-12 ASSESSMENT — FIBROSIS 4 INDEX: FIB4 SCORE: 0.86

## 2024-06-12 NOTE — DISCHARGE INSTRUCTIONS
Activity    No Heavy Lifting    Do not lift anything heavier than 5 pounds  No heavy lifting until neurosurgery says otherwise      No Strenuous Activity    No strenuous activity until neurosurgery says otherwise.  You may tire easily; rest as needed during the day.

## 2024-06-12 NOTE — CARE PLAN
The patient is Stable - Low risk of patient condition declining or worsening    Shift Goals  Clinical Goals: get lumbar drain out  Patient Goals: go home  Family Goals: get him home    Progress made toward(s) clinical / shift goals:    Problem: Knowledge Deficit - Standard  Goal: Patient and family/care givers will demonstrate understanding of plan of care, disease process/condition, diagnostic tests and medications  Outcome: Met     Problem: Pain - Standard  Goal: Alleviation of pain or a reduction in pain to the patient’s comfort goal  Outcome: Met     Problem: Hemodynamics  Goal: Patient's hemodynamics, fluid balance and neurologic status will be stable or improve  Outcome: Met     Problem: Lumbar/Thoracic Spine Surgery  Goal: Post-Operative Lumbar/Thoracic Spine Surgery: Patient will achieve optimal post-surgical outcomes  Outcome: Met     Problem: Neuro Status  Goal: Neuro status will remain stable or improve  Outcome: Met     Problem: Lumbar Drain Management  Goal: Proper management/care of lumbar drain will be maintained  Outcome: Met       Patient is not progressing towards the following goals:N/A

## 2024-06-12 NOTE — DISCHARGE SUMMARY
Discharge Summary    CHIEF COMPLAINT ON ADMISSION  Chief Complaint   Patient presents with    Sent by MD     Sent by Dr Lester for spinal drain placement.         Reason for Admission  Sent by MD     Admission Date  6/5/2024    CODE STATUS  Full Code    HPI & HOSPITAL COURSE  This is a 79 y.o. 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  Patient had an lumbar spine laminectomy involving L3 and 4 on April 22.  This was complicated by CSF leak post procedure.  Patient was taken twice for repair, however these were ineffective.  He was therefore brought to the hospital for admission and placement of lumbar spine drain.  This was done on June 5, and the patient was admitted to the [facility] postprocedure.    In the [facility], the patient was observed with a drain open.  After period of 5 days, he has had the drain removed and had a trial of ambulation today.  He is not experiencing any headache, or neurologic symptoms.  He is able to ambulate the unit without issue.  I discussed case today with neurosurgery who recommend discharge home and close follow-up with them.    At this point we will discharge patient.  He does not require any therapy as he is ambulating independently and without issue.  He has as needed medications already at home.  Follow-up as already been arranged.      Therefore, he is discharged in good and stable condition to home with close outpatient follow-up.    The patient met 2-midnight criteria for an inpatient stay at the time of discharge.    Discharge Date  6/12/2024      FOLLOW UP ITEMS POST DISCHARGE  With Neuro Surgery; pt has an appointment scheduled    DISCHARGE DIAGNOSES  Principal Problem (Resolved):    CSF leak (POA: Unknown)  Active Problems:    Hypertension (POA: Unknown)    Hyperlipemia (Chronic) (POA: Yes)      Overview: High HDL  Resolved Problems:    ACP (advance care planning) (POA: Yes)    Pseudomeningocele (POA: Unknown)    Anxiety (POA:  Unknown)      FOLLOW UP  No future appointments.  Levar Cazares P.A.-C.  [address]  [place] [id number] [phone]    Go in 1 week(s)  Follow up as already scheduled      MEDICATIONS ON DISCHARGE     Medication List        CONTINUE taking these medications        Instructions   acetaminophen 500 MG Tabs  Commonly known as: Tylenol   Take 500-1,000 mg by mouth as needed for Moderate Pain.  Dose: 500-1,000 mg     atorvastatin 10 MG Tabs  Commonly known as: Lipitor   Take 10 mg by mouth every Monday, Wednesday, and Friday.  Dose: 10 mg     baclofen 5 MG Tabs  Commonly known as: Lioresal   Take 5 mg by mouth 3 times a day as needed (spasm).  Dose: 5 mg     butalbital/apap/caffeine -40 mg Tabs  Commonly known as: Fioricet   Take 1 Tablet by mouth every 6 hours as needed for Headache for up to 5 days.  Dose: 1 Tablet     cephALEXin 500 MG Caps  Commonly known as: Keflex   Take 500 mg by mouth 4 times a day. X 5 DAYS  Dose: 500 mg     lisinopril 5 MG Tabs  Commonly known as: Prinivil   Take 1 Tab by mouth every day.  Dose: 5 mg              Allergies  Allergies   Allergen Reactions    Hydrocodone Hives and Swelling     Throat swelling       DIET  Orders Placed This Encounter   Procedures    Diet Order Diet: Regular (finger foods please); Miscellaneous modifications: (optional): Finger Foods     Standing Status:   Standing     Number of Occurrences:   1     Order Specific Question:   Diet:     Answer:   Regular [1]     Comments:   finger foods please     Order Specific Question:   Miscellaneous modifications: (optional)     Answer:   Finger Foods  [2]    Discontinue Diet Tray     Standing Status:   Standing     Number of Occurrences:   1       ACTIVITY  Light duty. No lifiting or strenuous exercise  Weight bearing as tolerated    CONSULTATIONS  Neuro Surgery    PROCEDURES  Placement of L spine drain 6/5    LABORATORY  Lab Results   Component Value Date    SODIUM 140 06/10/2024    POTASSIUM 4.1  06/10/2024    CHLORIDE 107 06/10/2024    CO2 21 06/10/2024    GLUCOSE 102 (H) 06/10/2024    BUN 10 06/10/2024    CREATININE 0.67 06/10/2024        Lab Results   Component Value Date    WBC 5.9 06/08/2024    HEMOGLOBIN 14.0 06/08/2024    HEMATOCRIT 40.4 (L) 06/08/2024    PLATELETCT 220 06/08/2024        Total time of the discharge process exceeds 35 minutes. Most of that time spent with the patient on the day of DC reviewing tests, follow up etc.  DW Neuro Surgery on dy of discharge

## 2024-06-12 NOTE — CARE PLAN
Problem: Knowledge Deficit - Standard  Goal: Patient and family/care givers will demonstrate understanding of plan of care, disease process/condition, diagnostic tests and medications  Outcome: Progressing     Problem: Pain - Standard  Goal: Alleviation of pain or a reduction in pain to the patient’s comfort goal  Outcome: Progressing     Problem: Hemodynamics  Goal: Patient's hemodynamics, fluid balance and neurologic status will be stable or improve  Outcome: Progressing     Problem: Lumbar/Thoracic Spine Surgery  Goal: Post-Operative Lumbar/Thoracic Spine Surgery: Patient will achieve optimal post-surgical outcomes  Outcome: Progressing     Problem: Neuro Status  Goal: Neuro status will remain stable or improve  Outcome: Progressing     Problem: Lumbar Drain Management  Goal: Proper management/care of lumbar drain will be maintained  Outcome: Progressing     Patient draining 5-15mL/hr, tubing checked q1h, no kinks. Patient on bedrest except for toileting, bed flat.

## 2024-06-12 NOTE — DISCHARGE PLANNING
Care Transition Team Assessment    Admission Date: 6/5/2024  GMLOS: 3.1  ALOS: 7    6-Clicks ADL Score: 24  6-Clicks Mobility Score: 24      Anticipated Discharge Dispo: Discharge Disposition: Discharged to home/self care (01)    DME Needed: No    Action(s) Taken:     Pt discussed in IDT rounds. Pt is alert and oriented and on room air. Lumbar drain removed and if no drainage/HA, okay to DC home.      Pt currently lives in a one-story home in [place], [place]. DC support: significant other Yue [phone]. Pt's PCP is Dr. Maddison Stock. Insured with [facility] Medicare. Prior to hospitilzatin, pt was independent with ADLs. Denies drug and tobacco use.     Escalations Completed: None    Next Steps: No CM needs    Barriers to Discharge: None    Information Source  Orientation Level: Oriented X4  Information Given By: Patient  Informant's Name: Bobby Cruz  Who is responsible for making decisions for patient? : Patient    Readmission Evaluation  Is this a readmission?: Yes - unplanned readmission    Elopement Risk  Legal Hold: No  Ambulatory or Self Mobile in Wheelchair: Yes  Disoriented: No  Psychiatric Symptoms: None  History of Wandering: No  Elopement this Admit: No  Vocalizing Wanting to Leave: No  Displays Behaviors, Body Language Wanting to Leave: No-Not at Risk for Elopement  Elopement Risk: Not at Risk for Elopement    Interdisciplinary Discharge Planning  Lives with - Patient's Self Care Capacity: Significant Other  Patient or legal guardian wants to designate a caregiver: Yes  Caregiver name: Yue  ([facility]) Authorization for Release of Health Information has been completed: Yes  Support Systems: Spouse / Significant Other  Housing / Facility: 1 Story House    Discharge Preparedness  What is your plan after discharge?: Home with help  Prior Functional Level: Independent with Activities of Daily Living    Functional Assesment  Prior Functional Level: Independent with Activities of Daily Living    Finances  Financial  Barriers to Discharge: No  Prescription Coverage: Yes    Vision / Hearing Impairment  Right Eye Vision: Impaired, Wears Glasses  Left Eye Vision: Impaired, Wears Glasses    Domestic Abuse  Have you ever been the victim of abuse or violence?: No  Possible Abuse/Neglect Reported to:: Not Applicable    Anticipated Discharge Information  Discharge Disposition: Discharged to home/self care (01)

## 2024-06-12 NOTE — PROGRESS NOTES
Neurosurgery Progress Note    Subjective:  Lumbar drain placed  2024  Lumbar drain in place and working last 24hrs  67cc in last 9 hours  No other changes  Pain controlled  Denies h/a  No drainage from lumbar drain site.      Exam:  Motor 5/5 both LE  GCS 15  Wound- dry  Lumbar drain site: Dry      BP  Min: 90/54  Max: 143/78  Pulse  Av.7  Min: 46  Max: 73  Resp  Av.8  Min: 10  Max: 41  Temp  Av.7 °C (98.1 °F)  Min: 36.7 °C (98 °F)  Max: 36.8 °C (98.2 °F)  SpO2  Av.3 %  Min: 90 %  Max: 98 %    No data recorded          Recent Labs     06/10/24  0318   SODIUM 140   POTASSIUM 4.1   CHLORIDE 107   CO2 21   GLUCOSE 102*   BUN 10   CREATININE 0.67   CALCIUM 8.6                   Intake/Output                         24 0700 - 24 0659 24 07 - 24 0659      0886-2119 Total 9772-4259 1326-2328 Total                 Intake    IV Piggyback  --  100 100  --  -- --    Volume (mL) (ceFAZolin (Ancef) 2 g in  mL IVPB) -- 100 100 -- -- --    Total Intake -- 100 100 -- -- --       Output    Urine  100  -- 100  --  -- --    Number of Times Voided 1 x -- 1 x -- -- --    Urine Void (mL) 100 -- 100 -- -- --    Cerebrospinal Fluid  103  105 208  8  -- 8    CSF Output (mL) (CSF Drain Group Lumbar) 103 105 208 8 -- 8    Total Output 203 105 308 8 -- 8       Net I/O     -203 -5 -208 -8 -- -8              Intake/Output Summary (Last 24 hours) at 2024 0909  Last data filed at 2024 0700  Gross per 24 hour   Intake 100 ml   Output 315 ml   Net -215 ml             calcium carbonate  500-1,000 mg Q6HRS PRN    omeprazole  20 mg DAILY    senna-docusate  2 Tablet BID    And    polyethylene glycol/lytes  1 Packet QDAY PRN    polyethylene glycol/lytes  1 Packet DAILY    diazePAM  2 mg Q4HRS PRN    oxyCODONE immediate-release  5 mg Q6HRS PRN    lisinopril  5 mg DAILY    atorvastatin  10 mg MO, WE + FR    acetaminophen  1,000 mg PRN    butalbital/apap/caffeine  1 Tablet Q6HRS  PRN       Assessment and Plan:  Hospital day # 8  Lumbar drain day #5    Lumbar drain disconnected using bedside sterile technique  3cc 1% lidocaine  Two 3-0 nylon stitches placed  Patient tolerated lumbar drain removal. No complications.      Plan  Pressure dressing reapplied to wound today  Keep bedrest until noon  Slowly sit up and mobilize at noon  If no drainage or headache he is clear to discharge from NSX standpoint  Fioricet sent to pharmacy for patient on discharge.

## (undated) DEVICE — SET LEADWIRE 5 LEAD BEDSIDE DISPOSABLE ECG (1SET OF 5/EA)

## (undated) DEVICE — SET EXTENSION WITH 2 PORTS (48EA/CA) ***PART #2C8610 IS A SUBSTITUTE*****

## (undated) DEVICE — SPONGE GAUZESTER 4 X 4 4PLY - (128PK/CA)

## (undated) DEVICE — GLOVE BIOGEL INDICATOR SZ 8 SURGICAL PF LTX - (50/BX 4BX/CA)

## (undated) DEVICE — SUTURE GENERAL

## (undated) DEVICE — PATTIES SURG NEURO X-RAY 1/2X1/2 (10EA/PK 20PK/CS)

## (undated) DEVICE — CANISTER SUCTION 3000ML MECHANICAL FILTER AUTO SHUTOFF MEDI-VAC NONSTERILE LF DISP  (40EA/CA)

## (undated) DEVICE — DERMABOND ADVANCED - (12EA/BX)

## (undated) DEVICE — TUBING C&T SET FLYING LEADS DRAIN TUBING (10EA/BX)

## (undated) DEVICE — SUCTION INSTRUMENT YANKAUER BULBOUS TIP W/O VENT (50EA/CA)

## (undated) DEVICE — LACTATED RINGERS INJ. 500 ML - (24EA/CA)

## (undated) DEVICE — SENSOR OXIMETER ADULT SPO2 RD SET (20EA/BX)

## (undated) DEVICE — SEALER BIPOLAR 2.3 AQUAMANTYS

## (undated) DEVICE — DEVICE MONOPOLAR RF PEAK PLASMABLADE 3.0S

## (undated) DEVICE — GLOVE BIOGEL SZ 8 SURGICAL PF LTX - (50PR/BX 4BX/CA)

## (undated) DEVICE — TIP EXTENDED DURAL SEALANT AUTOSPRAY ADHERUS (5EA/PK)

## (undated) DEVICE — PATTIES SURG NEURO X-RAY 1X1 (10EA/PK 20PK/CA)

## (undated) DEVICE — SODIUM CHL IRRIGATION 0.9% 1000ML (12EA/CA)

## (undated) DEVICE — GOWN WARMING STANDARD FLEX - (30/CA)

## (undated) DEVICE — LACTATED RINGERS INJ 1000 ML - (14EA/CA 60CA/PF)

## (undated) DEVICE — SUTURE 2-0 VICRYL PLUS CT-1 - 8 X 18 INCH(12/BX)

## (undated) DEVICE — MIDAS LUBRICATOR DIFFUSER PACK (4EA/CA)

## (undated) DEVICE — SLEEVE, VASO, THIGH, MED

## (undated) DEVICE — CHLORAPREP 26 ML APPLICATOR - ORANGE TINT(25/CA)

## (undated) DEVICE — BLADE SURGICAL CLIPPER - (50EA/CA)

## (undated) DEVICE — SUTURE 4-0 30CM STRATAFIX SPIRAL PS-2 (12EA/BX)

## (undated) DEVICE — KIT SURGIFLO W/OUT THROMBIN - (6EA/CA)

## (undated) DEVICE — DRAPE LAPAROTOMY T SHEET - (12EA/CA)

## (undated) DEVICE — COVER LIGHT HANDLE ALC PLUS DISP (18EA/BX)

## (undated) DEVICE — TOOL MR8 14CM MATCH HD SYM-TRI 3MM DIAMETER (1/EA)

## (undated) DEVICE — DRAPE SURG STERI-DRAPE 7X11OD - (40EA/CA)

## (undated) DEVICE — HEADREST PRONEVIEW LARGE - (10/CA)

## (undated) DEVICE — DRAPE 36X28IN RAD CARM BND BG - (25/CA) O

## (undated) DEVICE — SUCTION TUBE FRAZIER 12FR STERILE (40EA/CA)

## (undated) DEVICE — SUTURE 1 VICRYL PLUS CTX - 8 X 18 INCH (12/BX)

## (undated) DEVICE — KIT EVACUATER 3 SPRING PVC LF 1/8 DRAIN SIZE (10EA/CA)"

## (undated) DEVICE — TUBING CLEARLINK DUO-VENT - C-FLO (48EA/CA)

## (undated) DEVICE — COVER MAYO STAND X-LG - (22EA/CA)

## (undated) DEVICE — ARMREST CRADLE FOAM - (2PR/PK 12PR/CA)

## (undated) DEVICE — PACK NEURO - (2EA/CA)

## (undated) DEVICE — ELECTRODE DUAL RETURN W/ CORD - (50/PK)